# Patient Record
Sex: MALE | Race: OTHER | Employment: OTHER | URBAN - METROPOLITAN AREA
[De-identification: names, ages, dates, MRNs, and addresses within clinical notes are randomized per-mention and may not be internally consistent; named-entity substitution may affect disease eponyms.]

---

## 2018-02-15 PROBLEM — E78.00 HYPERCHOLESTEREMIA: Status: ACTIVE | Noted: 2018-02-15

## 2018-06-01 ENCOUNTER — OFFICE VISIT (OUTPATIENT)
Dept: FAMILY MEDICINE CLINIC | Age: 33
End: 2018-06-01
Payer: MEDICAID

## 2018-06-01 VITALS
RESPIRATION RATE: 16 BRPM | WEIGHT: 247 LBS | DIASTOLIC BLOOD PRESSURE: 66 MMHG | HEART RATE: 80 BPM | SYSTOLIC BLOOD PRESSURE: 120 MMHG | OXYGEN SATURATION: 98 % | TEMPERATURE: 98.4 F | BODY MASS INDEX: 35.36 KG/M2 | HEIGHT: 70 IN

## 2018-06-01 DIAGNOSIS — J30.1 CHRONIC SEASONAL ALLERGIC RHINITIS DUE TO POLLEN: ICD-10-CM

## 2018-06-01 DIAGNOSIS — F41.9 ANXIETY: Primary | ICD-10-CM

## 2018-06-01 PROCEDURE — G8417 CALC BMI ABV UP PARAM F/U: HCPCS | Performed by: NURSE PRACTITIONER

## 2018-06-01 PROCEDURE — 1036F TOBACCO NON-USER: CPT | Performed by: NURSE PRACTITIONER

## 2018-06-01 PROCEDURE — G8427 DOCREV CUR MEDS BY ELIG CLIN: HCPCS | Performed by: NURSE PRACTITIONER

## 2018-06-01 PROCEDURE — 99213 OFFICE O/P EST LOW 20 MIN: CPT | Performed by: NURSE PRACTITIONER

## 2018-06-01 RX ORDER — BUSPIRONE HYDROCHLORIDE 10 MG/1
10 TABLET ORAL 3 TIMES DAILY PRN
Qty: 90 TABLET | Refills: 1 | Status: SHIPPED | OUTPATIENT
Start: 2018-06-01 | End: 2018-07-26 | Stop reason: SDUPTHER

## 2018-06-01 RX ORDER — MONTELUKAST SODIUM 10 MG/1
10 TABLET ORAL DAILY
Qty: 30 TABLET | Refills: 3 | Status: SHIPPED | OUTPATIENT
Start: 2018-06-01 | End: 2018-09-17 | Stop reason: SDUPTHER

## 2018-06-01 ASSESSMENT — ENCOUNTER SYMPTOMS
DIARRHEA: 0
VOMITING: 0
CONSTIPATION: 0
SHORTNESS OF BREATH: 0
NAUSEA: 0
SINUS PAIN: 1
COUGH: 0
EYE REDNESS: 1
WHEEZING: 0
SORE THROAT: 0

## 2018-07-20 ENCOUNTER — OFFICE VISIT (OUTPATIENT)
Dept: FAMILY MEDICINE CLINIC | Age: 33
End: 2018-07-20
Payer: MEDICAID

## 2018-07-20 VITALS
DIASTOLIC BLOOD PRESSURE: 74 MMHG | SYSTOLIC BLOOD PRESSURE: 122 MMHG | RESPIRATION RATE: 14 BRPM | HEIGHT: 71 IN | TEMPERATURE: 98.1 F | WEIGHT: 240 LBS | BODY MASS INDEX: 33.6 KG/M2 | OXYGEN SATURATION: 98 % | HEART RATE: 70 BPM

## 2018-07-20 DIAGNOSIS — L30.9 DERMATITIS: Primary | ICD-10-CM

## 2018-07-20 DIAGNOSIS — K64.9 HEMORRHOIDS, UNSPECIFIED HEMORRHOID TYPE: ICD-10-CM

## 2018-07-20 DIAGNOSIS — L73.9 FOLLICULITIS: ICD-10-CM

## 2018-07-20 PROCEDURE — G8427 DOCREV CUR MEDS BY ELIG CLIN: HCPCS | Performed by: NURSE PRACTITIONER

## 2018-07-20 PROCEDURE — 96372 THER/PROPH/DIAG INJ SC/IM: CPT | Performed by: NURSE PRACTITIONER

## 2018-07-20 PROCEDURE — 99214 OFFICE O/P EST MOD 30 MIN: CPT | Performed by: NURSE PRACTITIONER

## 2018-07-20 PROCEDURE — G8417 CALC BMI ABV UP PARAM F/U: HCPCS | Performed by: NURSE PRACTITIONER

## 2018-07-20 PROCEDURE — 1036F TOBACCO NON-USER: CPT | Performed by: NURSE PRACTITIONER

## 2018-07-20 RX ORDER — BUPROPION HYDROCHLORIDE 150 MG/1
150 TABLET ORAL EVERY MORNING
COMMUNITY
End: 2019-12-23

## 2018-07-20 RX ORDER — METHYLPREDNISOLONE ACETATE 80 MG/ML
80 INJECTION, SUSPENSION INTRA-ARTICULAR; INTRALESIONAL; INTRAMUSCULAR; SOFT TISSUE ONCE
Status: COMPLETED | OUTPATIENT
Start: 2018-07-20 | End: 2018-07-20

## 2018-07-20 RX ORDER — BUSPIRONE HYDROCHLORIDE 10 MG/1
10 TABLET ORAL 2 TIMES DAILY
COMMUNITY
End: 2018-07-26 | Stop reason: SDUPTHER

## 2018-07-20 RX ORDER — SULFAMETHOXAZOLE AND TRIMETHOPRIM 800; 160 MG/1; MG/1
1 TABLET ORAL 2 TIMES DAILY
Qty: 20 TABLET | Refills: 0 | Status: SHIPPED | OUTPATIENT
Start: 2018-07-20 | End: 2018-07-30

## 2018-07-20 RX ADMIN — METHYLPREDNISOLONE ACETATE 80 MG: 80 INJECTION, SUSPENSION INTRA-ARTICULAR; INTRALESIONAL; INTRAMUSCULAR; SOFT TISSUE at 10:17

## 2018-07-20 ASSESSMENT — ENCOUNTER SYMPTOMS
CONSTIPATION: 0
COUGH: 0
VOMITING: 0
DIARRHEA: 0
NAUSEA: 0
WHEEZING: 0
BLOOD IN STOOL: 0
SHORTNESS OF BREATH: 0

## 2018-07-20 NOTE — PROGRESS NOTES
hemorrhoid type  -     hydrocortisone (ANUSOL-HC) 2.5 % rectal cream; Place rectally 2 times daily.   -decrease caffeine, increase fiber  -contact office if does not improve as expected    Greater than 25  Minutes was spent with patient and more than 50% of the time was spent face to face counseling and educating regarding diagnoses

## 2018-07-26 DIAGNOSIS — F41.9 ANXIETY: ICD-10-CM

## 2018-07-26 RX ORDER — BUSPIRONE HYDROCHLORIDE 10 MG/1
TABLET ORAL
Qty: 90 TABLET | Refills: 1 | Status: SHIPPED | OUTPATIENT
Start: 2018-07-26 | End: 2018-09-18 | Stop reason: SDUPTHER

## 2018-09-17 DIAGNOSIS — J30.1 CHRONIC SEASONAL ALLERGIC RHINITIS DUE TO POLLEN: ICD-10-CM

## 2018-09-18 DIAGNOSIS — F41.9 ANXIETY: ICD-10-CM

## 2018-09-18 RX ORDER — MONTELUKAST SODIUM 10 MG/1
TABLET ORAL
Qty: 30 TABLET | Refills: 3 | Status: SHIPPED | OUTPATIENT
Start: 2018-09-18 | End: 2019-01-15 | Stop reason: SDUPTHER

## 2018-09-18 RX ORDER — BUSPIRONE HYDROCHLORIDE 10 MG/1
TABLET ORAL
Qty: 90 TABLET | Refills: 1 | Status: SHIPPED | OUTPATIENT
Start: 2018-09-18 | End: 2018-11-16 | Stop reason: SDUPTHER

## 2018-11-16 DIAGNOSIS — F41.9 ANXIETY: ICD-10-CM

## 2018-11-16 RX ORDER — BUSPIRONE HYDROCHLORIDE 10 MG/1
TABLET ORAL
Qty: 90 TABLET | Refills: 1 | Status: SHIPPED | OUTPATIENT
Start: 2018-11-16 | End: 2019-07-14 | Stop reason: SDUPTHER

## 2019-01-15 DIAGNOSIS — J30.1 CHRONIC SEASONAL ALLERGIC RHINITIS DUE TO POLLEN: ICD-10-CM

## 2019-01-15 RX ORDER — MONTELUKAST SODIUM 10 MG/1
TABLET ORAL
Qty: 30 TABLET | Refills: 3 | Status: SHIPPED | OUTPATIENT
Start: 2019-01-15 | End: 2019-05-15 | Stop reason: SDUPTHER

## 2019-02-11 ENCOUNTER — HOSPITAL ENCOUNTER (OUTPATIENT)
Age: 34
Discharge: HOME | End: 2019-02-13

## 2019-02-11 PROCEDURE — 88302 TISSUE EXAM BY PATHOLOGIST: CPT

## 2019-03-05 ENCOUNTER — OFFICE VISIT (OUTPATIENT)
Dept: FAMILY MEDICINE CLINIC | Age: 34
End: 2019-03-05
Payer: MEDICAID

## 2019-03-05 VITALS
TEMPERATURE: 98.2 F | HEIGHT: 71 IN | OXYGEN SATURATION: 98 % | HEART RATE: 67 BPM | BODY MASS INDEX: 34.02 KG/M2 | SYSTOLIC BLOOD PRESSURE: 100 MMHG | RESPIRATION RATE: 16 BRPM | WEIGHT: 243 LBS | DIASTOLIC BLOOD PRESSURE: 70 MMHG

## 2019-03-05 DIAGNOSIS — R20.0 NUMBNESS AND TINGLING IN LEFT HAND: ICD-10-CM

## 2019-03-05 DIAGNOSIS — R20.2 NUMBNESS AND TINGLING IN LEFT HAND: ICD-10-CM

## 2019-03-05 DIAGNOSIS — E78.00 HYPERCHOLESTEREMIA: Primary | ICD-10-CM

## 2019-03-05 DIAGNOSIS — E55.9 VITAMIN D DEFICIENCY: ICD-10-CM

## 2019-03-05 PROCEDURE — 1036F TOBACCO NON-USER: CPT | Performed by: NURSE PRACTITIONER

## 2019-03-05 PROCEDURE — G8427 DOCREV CUR MEDS BY ELIG CLIN: HCPCS | Performed by: NURSE PRACTITIONER

## 2019-03-05 PROCEDURE — 99213 OFFICE O/P EST LOW 20 MIN: CPT | Performed by: NURSE PRACTITIONER

## 2019-03-05 PROCEDURE — G8417 CALC BMI ABV UP PARAM F/U: HCPCS | Performed by: NURSE PRACTITIONER

## 2019-03-05 PROCEDURE — G8484 FLU IMMUNIZE NO ADMIN: HCPCS | Performed by: NURSE PRACTITIONER

## 2019-03-05 RX ORDER — ERGOCALCIFEROL 1.25 MG/1
50000 CAPSULE ORAL
Qty: 24 CAPSULE | Refills: 0 | Status: SHIPPED | OUTPATIENT
Start: 2019-03-07 | End: 2019-05-31

## 2019-03-05 RX ORDER — ICOSAPENT ETHYL 1000 MG/1
2 CAPSULE ORAL 2 TIMES DAILY
Qty: 120 CAPSULE | Refills: 3 | Status: SHIPPED | OUTPATIENT
Start: 2019-03-05 | End: 2019-05-31

## 2019-03-05 ASSESSMENT — PATIENT HEALTH QUESTIONNAIRE - PHQ9
SUM OF ALL RESPONSES TO PHQ QUESTIONS 1-9: 1
2. FEELING DOWN, DEPRESSED OR HOPELESS: 1
SUM OF ALL RESPONSES TO PHQ QUESTIONS 1-9: 1
1. LITTLE INTEREST OR PLEASURE IN DOING THINGS: 0
SUM OF ALL RESPONSES TO PHQ9 QUESTIONS 1 & 2: 1

## 2019-03-05 ASSESSMENT — ENCOUNTER SYMPTOMS
VOMITING: 0
CONSTIPATION: 0
DIARRHEA: 0
SHORTNESS OF BREATH: 0
NAUSEA: 0
COUGH: 0
WHEEZING: 0

## 2019-03-16 ENCOUNTER — APPOINTMENT (OUTPATIENT)
Dept: CT IMAGING | Age: 34
DRG: 241 | End: 2019-03-16
Payer: MEDICAID

## 2019-03-16 ENCOUNTER — HOSPITAL ENCOUNTER (INPATIENT)
Age: 34
LOS: 2 days | Discharge: HOME | DRG: 241 | End: 2019-03-18
Attending: EMERGENCY MEDICINE | Admitting: SURGERY
Payer: MEDICAID

## 2019-03-16 DIAGNOSIS — R11.2 NAUSEA AND VOMITING, INTRACTABILITY OF VOMITING NOT SPECIFIED, UNSPECIFIED VOMITING TYPE: ICD-10-CM

## 2019-03-16 DIAGNOSIS — R10.84 GENERALIZED ABDOMINAL PAIN: Primary | ICD-10-CM

## 2019-03-16 LAB
ALBUMIN SERPL-MCNC: 4.7 G/DL (ref 3.5–5.2)
ALP BLD-CCNC: 79 U/L (ref 40–129)
ALT SERPL-CCNC: 31 U/L (ref 0–40)
ANION GAP SERPL CALCULATED.3IONS-SCNC: 14 MMOL/L (ref 7–16)
AST SERPL-CCNC: 27 U/L (ref 0–39)
BASOPHILS ABSOLUTE: 0 E9/L (ref 0–0.2)
BASOPHILS RELATIVE PERCENT: 0.3 % (ref 0–2)
BILIRUB SERPL-MCNC: 0.5 MG/DL (ref 0–1.2)
BILIRUBIN URINE: NEGATIVE
BLOOD, URINE: NEGATIVE
BUN BLDV-MCNC: 21 MG/DL (ref 6–20)
CALCIUM SERPL-MCNC: 10.1 MG/DL (ref 8.6–10.2)
CHLORIDE BLD-SCNC: 102 MMOL/L (ref 98–107)
CLARITY: CLEAR
CO2: 25 MMOL/L (ref 22–29)
COLOR: YELLOW
CREAT SERPL-MCNC: 1.2 MG/DL (ref 0.7–1.2)
EOSINOPHILS ABSOLUTE: 0.1 E9/L (ref 0.05–0.5)
EOSINOPHILS RELATIVE PERCENT: 0.9 % (ref 0–6)
GFR AFRICAN AMERICAN: >60
GFR NON-AFRICAN AMERICAN: >60 ML/MIN/1.73
GLUCOSE BLD-MCNC: 134 MG/DL (ref 74–99)
GLUCOSE URINE: NEGATIVE MG/DL
HCT VFR BLD CALC: 47.6 % (ref 37–54)
HEMOGLOBIN: 16.9 G/DL (ref 12.5–16.5)
KETONES, URINE: NEGATIVE MG/DL
LACTIC ACID: 1.8 MMOL/L (ref 0.5–2.2)
LEUKOCYTE ESTERASE, URINE: NEGATIVE
LIPASE: 19 U/L (ref 13–60)
LYMPHOCYTES ABSOLUTE: 0.57 E9/L (ref 1.5–4)
LYMPHOCYTES RELATIVE PERCENT: 5.3 % (ref 20–42)
MCH RBC QN AUTO: 29.8 PG (ref 26–35)
MCHC RBC AUTO-ENTMCNC: 35.5 % (ref 32–34.5)
MCV RBC AUTO: 84 FL (ref 80–99.9)
MONOCYTES ABSOLUTE: 0.34 E9/L (ref 0.1–0.95)
MONOCYTES RELATIVE PERCENT: 2.6 % (ref 2–12)
NEUTROPHILS ABSOLUTE: 10.28 E9/L (ref 1.8–7.3)
NEUTROPHILS RELATIVE PERCENT: 91.2 % (ref 43–80)
NITRITE, URINE: NEGATIVE
PDW BLD-RTO: 12.2 FL (ref 11.5–15)
PH UA: 5.5 (ref 5–9)
PLATELET # BLD: 223 E9/L (ref 130–450)
PMV BLD AUTO: 10.6 FL (ref 7–12)
POTASSIUM SERPL-SCNC: 4.5 MMOL/L (ref 3.5–5)
PROTEIN UA: NEGATIVE MG/DL
RBC # BLD: 5.67 E12/L (ref 3.8–5.8)
SODIUM BLD-SCNC: 141 MMOL/L (ref 132–146)
SPECIFIC GRAVITY UA: 1.02 (ref 1–1.03)
TOTAL PROTEIN: 7.7 G/DL (ref 6.4–8.3)
UROBILINOGEN, URINE: 0.2 E.U./DL
WBC # BLD: 11.3 E9/L (ref 4.5–11.5)

## 2019-03-16 PROCEDURE — 96375 TX/PRO/DX INJ NEW DRUG ADDON: CPT

## 2019-03-16 PROCEDURE — 96374 THER/PROPH/DIAG INJ IV PUSH: CPT

## 2019-03-16 PROCEDURE — 6360000004 HC RX CONTRAST MEDICATION: Performed by: RADIOLOGY

## 2019-03-16 PROCEDURE — 80053 COMPREHEN METABOLIC PANEL: CPT

## 2019-03-16 PROCEDURE — 83690 ASSAY OF LIPASE: CPT

## 2019-03-16 PROCEDURE — 6360000002 HC RX W HCPCS: Performed by: SURGERY

## 2019-03-16 PROCEDURE — 2580000003 HC RX 258: Performed by: EMERGENCY MEDICINE

## 2019-03-16 PROCEDURE — 96376 TX/PRO/DX INJ SAME DRUG ADON: CPT

## 2019-03-16 PROCEDURE — 99285 EMERGENCY DEPT VISIT HI MDM: CPT

## 2019-03-16 PROCEDURE — 83605 ASSAY OF LACTIC ACID: CPT

## 2019-03-16 PROCEDURE — 1200000000 HC SEMI PRIVATE

## 2019-03-16 PROCEDURE — 2580000003 HC RX 258: Performed by: SURGERY

## 2019-03-16 PROCEDURE — 6360000002 HC RX W HCPCS: Performed by: EMERGENCY MEDICINE

## 2019-03-16 PROCEDURE — 81003 URINALYSIS AUTO W/O SCOPE: CPT

## 2019-03-16 PROCEDURE — 6360000002 HC RX W HCPCS

## 2019-03-16 PROCEDURE — 85025 COMPLETE CBC W/AUTO DIFF WBC: CPT

## 2019-03-16 PROCEDURE — G0378 HOSPITAL OBSERVATION PER HR: HCPCS

## 2019-03-16 PROCEDURE — 74177 CT ABD & PELVIS W/CONTRAST: CPT

## 2019-03-16 PROCEDURE — 6370000000 HC RX 637 (ALT 250 FOR IP): Performed by: SURGERY

## 2019-03-16 PROCEDURE — 36415 COLL VENOUS BLD VENIPUNCTURE: CPT

## 2019-03-16 RX ORDER — SODIUM CHLORIDE 0.9 % (FLUSH) 0.9 %
10 SYRINGE (ML) INJECTION EVERY 12 HOURS SCHEDULED
Status: DISCONTINUED | OUTPATIENT
Start: 2019-03-16 | End: 2019-03-18 | Stop reason: HOSPADM

## 2019-03-16 RX ORDER — SODIUM CHLORIDE 9 MG/ML
INJECTION, SOLUTION INTRAVENOUS CONTINUOUS
Status: DISCONTINUED | OUTPATIENT
Start: 2019-03-16 | End: 2019-03-18

## 2019-03-16 RX ORDER — SODIUM CHLORIDE 0.9 % (FLUSH) 0.9 %
10 SYRINGE (ML) INJECTION PRN
Status: DISCONTINUED | OUTPATIENT
Start: 2019-03-16 | End: 2019-03-18 | Stop reason: HOSPADM

## 2019-03-16 RX ORDER — HYDROCODONE BITARTRATE AND ACETAMINOPHEN 5; 325 MG/1; MG/1
1 TABLET ORAL EVERY 4 HOURS PRN
Status: DISCONTINUED | OUTPATIENT
Start: 2019-03-16 | End: 2019-03-18 | Stop reason: HOSPADM

## 2019-03-16 RX ORDER — FENTANYL CITRATE 50 UG/ML
50 INJECTION, SOLUTION INTRAMUSCULAR; INTRAVENOUS ONCE
Status: COMPLETED | OUTPATIENT
Start: 2019-03-16 | End: 2019-03-16

## 2019-03-16 RX ORDER — ONDANSETRON 2 MG/ML
4 INJECTION INTRAMUSCULAR; INTRAVENOUS EVERY 6 HOURS PRN
Status: DISCONTINUED | OUTPATIENT
Start: 2019-03-16 | End: 2019-03-18 | Stop reason: HOSPADM

## 2019-03-16 RX ORDER — MORPHINE SULFATE 4 MG/ML
INJECTION, SOLUTION INTRAMUSCULAR; INTRAVENOUS
Status: COMPLETED
Start: 2019-03-16 | End: 2019-03-16

## 2019-03-16 RX ORDER — ONDANSETRON 2 MG/ML
4 INJECTION INTRAMUSCULAR; INTRAVENOUS ONCE
Status: COMPLETED | OUTPATIENT
Start: 2019-03-16 | End: 2019-03-16

## 2019-03-16 RX ORDER — HYDROCODONE BITARTRATE AND ACETAMINOPHEN 5; 325 MG/1; MG/1
2 TABLET ORAL EVERY 4 HOURS PRN
Status: DISCONTINUED | OUTPATIENT
Start: 2019-03-16 | End: 2019-03-18 | Stop reason: HOSPADM

## 2019-03-16 RX ORDER — MORPHINE SULFATE 4 MG/ML
4 INJECTION, SOLUTION INTRAMUSCULAR; INTRAVENOUS
Status: DISCONTINUED | OUTPATIENT
Start: 2019-03-16 | End: 2019-03-18 | Stop reason: HOSPADM

## 2019-03-16 RX ORDER — SODIUM CHLORIDE, SODIUM LACTATE, POTASSIUM CHLORIDE, CALCIUM CHLORIDE 600; 310; 30; 20 MG/100ML; MG/100ML; MG/100ML; MG/100ML
INJECTION, SOLUTION INTRAVENOUS CONTINUOUS
Status: DISCONTINUED | OUTPATIENT
Start: 2019-03-16 | End: 2019-03-18

## 2019-03-16 RX ORDER — MORPHINE SULFATE 4 MG/ML
2 INJECTION, SOLUTION INTRAMUSCULAR; INTRAVENOUS
Status: DISCONTINUED | OUTPATIENT
Start: 2019-03-16 | End: 2019-03-18 | Stop reason: HOSPADM

## 2019-03-16 RX ORDER — KETOROLAC TROMETHAMINE 30 MG/ML
30 INJECTION, SOLUTION INTRAMUSCULAR; INTRAVENOUS EVERY 6 HOURS
Status: COMPLETED | OUTPATIENT
Start: 2019-03-16 | End: 2019-03-18

## 2019-03-16 RX ORDER — 0.9 % SODIUM CHLORIDE 0.9 %
1000 INTRAVENOUS SOLUTION INTRAVENOUS ONCE
Status: COMPLETED | OUTPATIENT
Start: 2019-03-16 | End: 2019-03-16

## 2019-03-16 RX ORDER — ACETAMINOPHEN 325 MG/1
650 TABLET ORAL EVERY 4 HOURS PRN
Status: DISCONTINUED | OUTPATIENT
Start: 2019-03-16 | End: 2019-03-18 | Stop reason: HOSPADM

## 2019-03-16 RX ADMIN — ONDANSETRON 4 MG: 2 INJECTION INTRAMUSCULAR; INTRAVENOUS at 23:42

## 2019-03-16 RX ADMIN — ACETAMINOPHEN 650 MG: 325 TABLET, FILM COATED ORAL at 18:52

## 2019-03-16 RX ADMIN — MORPHINE SULFATE 4 MG: 4 INJECTION, SOLUTION INTRAMUSCULAR; INTRAVENOUS at 14:20

## 2019-03-16 RX ADMIN — FENTANYL CITRATE 50 MCG: 50 INJECTION, SOLUTION INTRAMUSCULAR; INTRAVENOUS at 11:02

## 2019-03-16 RX ADMIN — ONDANSETRON 4 MG: 2 INJECTION INTRAMUSCULAR; INTRAVENOUS at 11:02

## 2019-03-16 RX ADMIN — MORPHINE SULFATE 4 MG: 4 INJECTION INTRAVENOUS at 14:20

## 2019-03-16 RX ADMIN — SODIUM CHLORIDE, POTASSIUM CHLORIDE, SODIUM LACTATE AND CALCIUM CHLORIDE: 600; 310; 30; 20 INJECTION, SOLUTION INTRAVENOUS at 15:47

## 2019-03-16 RX ADMIN — IOPAMIDOL 80 ML: 755 INJECTION, SOLUTION INTRAVENOUS at 12:39

## 2019-03-16 RX ADMIN — SODIUM CHLORIDE 1000 ML: 9 INJECTION, SOLUTION INTRAVENOUS at 11:02

## 2019-03-16 RX ADMIN — KETOROLAC TROMETHAMINE 30 MG: 30 INJECTION, SOLUTION INTRAMUSCULAR; INTRAVENOUS at 16:47

## 2019-03-16 RX ADMIN — SODIUM CHLORIDE, POTASSIUM CHLORIDE, SODIUM LACTATE AND CALCIUM CHLORIDE: 600; 310; 30; 20 INJECTION, SOLUTION INTRAVENOUS at 21:29

## 2019-03-16 RX ADMIN — SODIUM CHLORIDE: 9 INJECTION, SOLUTION INTRAVENOUS at 14:13

## 2019-03-16 RX ADMIN — ONDANSETRON 4 MG: 2 INJECTION INTRAMUSCULAR; INTRAVENOUS at 16:49

## 2019-03-16 RX ADMIN — KETOROLAC TROMETHAMINE 30 MG: 30 INJECTION, SOLUTION INTRAMUSCULAR; INTRAVENOUS at 21:28

## 2019-03-16 ASSESSMENT — ENCOUNTER SYMPTOMS
CONSTIPATION: 0
VOMITING: 1
EYE DISCHARGE: 0
BLOOD IN STOOL: 0
EYE PAIN: 0
COUGH: 0
ABDOMINAL DISTENTION: 0
SHORTNESS OF BREATH: 0
DIARRHEA: 1
NAUSEA: 1
BACK PAIN: 0
EYE REDNESS: 0
ABDOMINAL PAIN: 1

## 2019-03-16 ASSESSMENT — PAIN DESCRIPTION - DESCRIPTORS
DESCRIPTORS: CRAMPING;STABBING;SHARP
DESCRIPTORS: DISCOMFORT;ACHING;CRAMPING

## 2019-03-16 ASSESSMENT — PAIN SCALES - GENERAL
PAINLEVEL_OUTOF10: 7
PAINLEVEL_OUTOF10: 6
PAINLEVEL_OUTOF10: 5
PAINLEVEL_OUTOF10: 6
PAINLEVEL_OUTOF10: 6
PAINLEVEL_OUTOF10: 7
PAINLEVEL_OUTOF10: 7

## 2019-03-16 ASSESSMENT — PAIN DESCRIPTION - FREQUENCY: FREQUENCY: INTERMITTENT

## 2019-03-16 ASSESSMENT — PAIN DESCRIPTION - LOCATION: LOCATION: ABDOMEN

## 2019-03-16 ASSESSMENT — PAIN DESCRIPTION - PAIN TYPE: TYPE: ACUTE PAIN

## 2019-03-16 ASSESSMENT — PAIN - FUNCTIONAL ASSESSMENT: PAIN_FUNCTIONAL_ASSESSMENT: ACTIVITIES ARE NOT PREVENTED

## 2019-03-16 ASSESSMENT — PAIN DESCRIPTION - ORIENTATION: ORIENTATION: MID;LOWER

## 2019-03-16 NOTE — ED PROVIDER NOTES
breath sounds normal. No respiratory distress. He has no wheezes. He has no rales. Abdominal: Soft. There is tenderness. There is guarding. There is no rebound. Diminished bowel sounds   Musculoskeletal: He exhibits no edema. Neurological: He is alert and oriented to person, place, and time. No cranial nerve deficit. Coordination normal.   Skin: Skin is warm and dry. Capillary refill takes less than 2 seconds. He is not diaphoretic. Psychiatric:   anxious   Nursing note and vitals reviewed. Procedures    MDM       .       --------------------------------------------- PAST HISTORY ---------------------------------------------  Past Medical History:  has a past medical history of Allergic rhinitis, Anxiety, Chronic back pain, Depression, and Neuropathy. Past Surgical History:  has a past surgical history that includes lumbar discectomy; Septoplasty; Cosmetic surgery; lumbar discectomy; and vascular surgery. Social History:  reports that he has never smoked. He has never used smokeless tobacco. He reports that he drinks alcohol. He reports that he does not use drugs. Family History: family history is not on file. The patients home medications have been reviewed.     Allergies: Gabapentin; Percocet [oxycodone-acetaminophen]; and Vicodin [hydrocodone-acetaminophen]    -------------------------------------------------- RESULTS -------------------------------------------------    Lab  Results for orders placed or performed during the hospital encounter of 03/16/19   CBC Auto Differential   Result Value Ref Range    WBC 11.3 4.5 - 11.5 E9/L    RBC 5.67 3.80 - 5.80 E12/L    Hemoglobin 16.9 (H) 12.5 - 16.5 g/dL    Hematocrit 47.6 37.0 - 54.0 %    MCV 84.0 80.0 - 99.9 fL    MCH 29.8 26.0 - 35.0 pg    MCHC 35.5 (H) 32.0 - 34.5 %    RDW 12.2 11.5 - 15.0 fL    Platelets 879 108 - 856 E9/L    MPV 10.6 7.0 - 12.0 fL    Neutrophils % 91.2 (H) 43.0 - 80.0 %    Lymphocytes % 5.3 (L) 20.0 - 42.0 %    Monocytes % 2.6 2.0 - 12.0 %    Eosinophils % 0.9 0.0 - 6.0 %    Basophils % 0.3 0.0 - 2.0 %    Neutrophils # 10.28 (H) 1.80 - 7.30 E9/L    Lymphocytes # 0.57 (L) 1.50 - 4.00 E9/L    Monocytes # 0.34 0.10 - 0.95 E9/L    Eosinophils # 0.10 0.05 - 0.50 E9/L    Basophils # 0.00 0.00 - 0.20 E9/L   Comprehensive Metabolic Panel   Result Value Ref Range    Sodium 141 132 - 146 mmol/L    Potassium 4.5 3.5 - 5.0 mmol/L    Chloride 102 98 - 107 mmol/L    CO2 25 22 - 29 mmol/L    Anion Gap 14 7 - 16 mmol/L    Glucose 134 (H) 74 - 99 mg/dL    BUN 21 (H) 6 - 20 mg/dL    CREATININE 1.2 0.7 - 1.2 mg/dL    GFR Non-African American >60 >=60 mL/min/1.73    GFR African American >60     Calcium 10.1 8.6 - 10.2 mg/dL    Total Protein 7.7 6.4 - 8.3 g/dL    Alb 4.7 3.5 - 5.2 g/dL    Total Bilirubin 0.5 0.0 - 1.2 mg/dL    Alkaline Phosphatase 79 40 - 129 U/L    ALT 31 0 - 40 U/L    AST 27 0 - 39 U/L   Lipase   Result Value Ref Range    Lipase 19 13 - 60 U/L   Lactic Acid, Plasma   Result Value Ref Range    Lactic Acid 1.8 0.5 - 2.2 mmol/L   Urinalysis   Result Value Ref Range    Color, UA Yellow Straw/Yellow    Clarity, UA Clear Clear    Glucose, Ur Negative Negative mg/dL    Bilirubin Urine Negative Negative    Ketones, Urine Negative Negative mg/dL    Specific Gravity, UA 1.025 1.005 - 1.030    Blood, Urine Negative Negative    pH, UA 5.5 5.0 - 9.0    Protein, UA Negative Negative mg/dL    Urobilinogen, Urine 0.2 <2.0 E.U./dL    Nitrite, Urine Negative Negative    Leukocyte Esterase, Urine Negative Negative       Radiology  CT ABDOMEN PELVIS W IV CONTRAST Additional Contrast? Oral   Final Result   1. Most of oral contrast is located within the stomach, which is   moderately distended. Visualization of contrast within the small bowel   more distally excludes complete small bowel obstruction and findings   raise the question of gastroparesis versus partial gastric outlet   obstruction. Clinical correlation is recommended.    2. Contrast within distended distal thoracic esophagus, which may be   secondary to moderately distended state of the stomach. 3. Moderately distended small bowel loops within left side of abdomen   and upper pelvis, with findings suggesting nonspecific segmental   circumferential thickening of these dilated small bowel loops. Question infectious or inflammatory process. 4. Appearance of appendix is visualized on this examination and is   unremarkable. 5. Fluid within portions of colon and rectum, consistent with patient   history of diarrhea            ------------------------- NURSING NOTES AND VITALS REVIEWED ---------------------------  Date / Time Roomed:  3/16/2019 10:10 AM  ED Bed Assignment:  13/13    The nursing notes within the ED encounter and vital signs as below have been reviewed. Patient Vitals for the past 24 hrs:   BP Temp Temp src Pulse Resp SpO2 Height Weight   03/16/19 1215 125/78 98.5 °F (36.9 °C) Oral 106 16 97 % -- --   03/16/19 1019 125/80 97.9 °F (36.6 °C) Oral 102 18 99 % 5' 11\" (1.803 m) 237 lb (107.5 kg)       Oxygen Saturation Interpretation: Normal      ------------------------------------------ PROGRESS NOTES ------------------------------------------  Re-evaluation(s):  Time: 13:40. Patients symptoms show no change  Repeat physical examination is not changed. She continues to guard with palpation of the abdomen. I have spoken with the patient and discussed todays results, in addition to providing specific details for the plan of care and counseling regarding the diagnosis and prognosis. Their questions are answered at this time and they are agreeable with the plan.      --------------------------------- ADDITIONAL PROVIDER NOTES ---------------------------------  Consultations:  Spoke with Dr. Mary Estrada,  They will admit this patient.     This patient's ED course included: a personal history and physicial examination and multiple bedside re-evaluations    This patient has remained

## 2019-03-17 ENCOUNTER — APPOINTMENT (OUTPATIENT)
Dept: GENERAL RADIOLOGY | Age: 34
DRG: 241 | End: 2019-03-17
Payer: MEDICAID

## 2019-03-17 LAB
ALBUMIN SERPL-MCNC: 3.7 G/DL (ref 3.5–5.2)
ALP BLD-CCNC: 56 U/L (ref 40–129)
ALT SERPL-CCNC: 24 U/L (ref 0–40)
ANION GAP SERPL CALCULATED.3IONS-SCNC: 11 MMOL/L (ref 7–16)
AST SERPL-CCNC: 15 U/L (ref 0–39)
BILIRUB SERPL-MCNC: 0.5 MG/DL (ref 0–1.2)
BUN BLDV-MCNC: 15 MG/DL (ref 6–20)
C-REACTIVE PROTEIN: 9.1 MG/DL (ref 0–0.4)
CALCIUM SERPL-MCNC: 8.8 MG/DL (ref 8.6–10.2)
CHLORIDE BLD-SCNC: 101 MMOL/L (ref 98–107)
CO2: 24 MMOL/L (ref 22–29)
CREAT SERPL-MCNC: 1 MG/DL (ref 0.7–1.2)
GFR AFRICAN AMERICAN: >60
GFR NON-AFRICAN AMERICAN: >60 ML/MIN/1.73
GLUCOSE BLD-MCNC: 125 MG/DL (ref 74–99)
HCT VFR BLD CALC: 41.8 % (ref 37–54)
HEMOGLOBIN: 14.6 G/DL (ref 12.5–16.5)
LIPASE: 15 U/L (ref 13–60)
MCH RBC QN AUTO: 29.1 PG (ref 26–35)
MCHC RBC AUTO-ENTMCNC: 34.9 % (ref 32–34.5)
MCV RBC AUTO: 83.4 FL (ref 80–99.9)
PDW BLD-RTO: 12.3 FL (ref 11.5–15)
PLATELET # BLD: 187 E9/L (ref 130–450)
PMV BLD AUTO: 10.1 FL (ref 7–12)
POTASSIUM REFLEX MAGNESIUM: 3.8 MMOL/L (ref 3.5–5)
RBC # BLD: 5.01 E12/L (ref 3.8–5.8)
SEDIMENTATION RATE, ERYTHROCYTE: 1 MM/HR (ref 0–15)
SODIUM BLD-SCNC: 136 MMOL/L (ref 132–146)
TOTAL PROTEIN: 6.2 G/DL (ref 6.4–8.3)
WBC # BLD: 5.3 E9/L (ref 4.5–11.5)

## 2019-03-17 PROCEDURE — 85027 COMPLETE CBC AUTOMATED: CPT

## 2019-03-17 PROCEDURE — 96376 TX/PRO/DX INJ SAME DRUG ADON: CPT

## 2019-03-17 PROCEDURE — 87328 CRYPTOSPORIDIUM AG IA: CPT

## 2019-03-17 PROCEDURE — 1200000000 HC SEMI PRIVATE

## 2019-03-17 PROCEDURE — 87425 ROTAVIRUS AG IA: CPT

## 2019-03-17 PROCEDURE — 85651 RBC SED RATE NONAUTOMATED: CPT

## 2019-03-17 PROCEDURE — C9113 INJ PANTOPRAZOLE SODIUM, VIA: HCPCS | Performed by: STUDENT IN AN ORGANIZED HEALTH CARE EDUCATION/TRAINING PROGRAM

## 2019-03-17 PROCEDURE — 6370000000 HC RX 637 (ALT 250 FOR IP): Performed by: STUDENT IN AN ORGANIZED HEALTH CARE EDUCATION/TRAINING PROGRAM

## 2019-03-17 PROCEDURE — 2580000003 HC RX 258: Performed by: SURGERY

## 2019-03-17 PROCEDURE — 83690 ASSAY OF LIPASE: CPT

## 2019-03-17 PROCEDURE — 6360000002 HC RX W HCPCS: Performed by: STUDENT IN AN ORGANIZED HEALTH CARE EDUCATION/TRAINING PROGRAM

## 2019-03-17 PROCEDURE — 87329 GIARDIA AG IA: CPT

## 2019-03-17 PROCEDURE — 86140 C-REACTIVE PROTEIN: CPT

## 2019-03-17 PROCEDURE — G0378 HOSPITAL OBSERVATION PER HR: HCPCS

## 2019-03-17 PROCEDURE — 6360000002 HC RX W HCPCS: Performed by: SURGERY

## 2019-03-17 PROCEDURE — 96375 TX/PRO/DX INJ NEW DRUG ADDON: CPT

## 2019-03-17 PROCEDURE — 89055 LEUKOCYTE ASSESSMENT FECAL: CPT

## 2019-03-17 PROCEDURE — 87045 FECES CULTURE AEROBIC BACT: CPT

## 2019-03-17 PROCEDURE — 74018 RADEX ABDOMEN 1 VIEW: CPT

## 2019-03-17 PROCEDURE — 99220 PR INITIAL OBSERVATION CARE/DAY 70 MINUTES: CPT | Performed by: SURGERY

## 2019-03-17 PROCEDURE — 36415 COLL VENOUS BLD VENIPUNCTURE: CPT

## 2019-03-17 PROCEDURE — 80053 COMPREHEN METABOLIC PANEL: CPT

## 2019-03-17 PROCEDURE — 87077 CULTURE AEROBIC IDENTIFY: CPT

## 2019-03-17 RX ORDER — PANTOPRAZOLE SODIUM 40 MG/10ML
40 INJECTION, POWDER, LYOPHILIZED, FOR SOLUTION INTRAVENOUS 2 TIMES DAILY
Status: DISCONTINUED | OUTPATIENT
Start: 2019-03-17 | End: 2019-03-18

## 2019-03-17 RX ADMIN — KETOROLAC TROMETHAMINE 30 MG: 30 INJECTION, SOLUTION INTRAMUSCULAR; INTRAVENOUS at 16:44

## 2019-03-17 RX ADMIN — Medication 10 ML: at 21:59

## 2019-03-17 RX ADMIN — ONDANSETRON 4 MG: 2 INJECTION INTRAMUSCULAR; INTRAVENOUS at 20:22

## 2019-03-17 RX ADMIN — MORPHINE SULFATE 2 MG: 4 INJECTION, SOLUTION INTRAMUSCULAR; INTRAVENOUS at 06:23

## 2019-03-17 RX ADMIN — PANTOPRAZOLE SODIUM 40 MG: 40 INJECTION, POWDER, FOR SOLUTION INTRAVENOUS at 21:59

## 2019-03-17 RX ADMIN — KETOROLAC TROMETHAMINE 30 MG: 30 INJECTION, SOLUTION INTRAMUSCULAR; INTRAVENOUS at 04:03

## 2019-03-17 RX ADMIN — SODIUM CHLORIDE, POTASSIUM CHLORIDE, SODIUM LACTATE AND CALCIUM CHLORIDE: 600; 310; 30; 20 INJECTION, SOLUTION INTRAVENOUS at 12:38

## 2019-03-17 RX ADMIN — KETOROLAC TROMETHAMINE 30 MG: 30 INJECTION, SOLUTION INTRAMUSCULAR; INTRAVENOUS at 21:59

## 2019-03-17 RX ADMIN — KETOROLAC TROMETHAMINE 30 MG: 30 INJECTION, SOLUTION INTRAMUSCULAR; INTRAVENOUS at 09:09

## 2019-03-17 RX ADMIN — PHENOL: 1.5 LIQUID ORAL at 20:22

## 2019-03-17 RX ADMIN — PANTOPRAZOLE SODIUM 40 MG: 40 INJECTION, POWDER, FOR SOLUTION INTRAVENOUS at 12:38

## 2019-03-17 RX ADMIN — Medication 10 ML: at 12:38

## 2019-03-17 RX ADMIN — SODIUM CHLORIDE, POTASSIUM CHLORIDE, SODIUM LACTATE AND CALCIUM CHLORIDE: 600; 310; 30; 20 INJECTION, SOLUTION INTRAVENOUS at 21:59

## 2019-03-17 ASSESSMENT — PAIN DESCRIPTION - PAIN TYPE
TYPE: ACUTE PAIN
TYPE: ACUTE PAIN

## 2019-03-17 ASSESSMENT — PAIN SCALES - GENERAL
PAINLEVEL_OUTOF10: 6
PAINLEVEL_OUTOF10: 2
PAINLEVEL_OUTOF10: 5
PAINLEVEL_OUTOF10: 5
PAINLEVEL_OUTOF10: 4
PAINLEVEL_OUTOF10: 7
PAINLEVEL_OUTOF10: 5

## 2019-03-17 ASSESSMENT — PAIN DESCRIPTION - PROGRESSION: CLINICAL_PROGRESSION: GRADUALLY IMPROVING

## 2019-03-17 ASSESSMENT — PAIN - FUNCTIONAL ASSESSMENT: PAIN_FUNCTIONAL_ASSESSMENT: ACTIVITIES ARE NOT PREVENTED

## 2019-03-17 ASSESSMENT — PAIN DESCRIPTION - LOCATION
LOCATION: ABDOMEN
LOCATION: ABDOMEN

## 2019-03-17 ASSESSMENT — PAIN DESCRIPTION - DESCRIPTORS
DESCRIPTORS: SORE
DESCRIPTORS: DISCOMFORT;TENDER;SORE

## 2019-03-17 NOTE — PLAN OF CARE
Problem: Falls - Risk of:  Goal: Will remain free from falls  Description  Will remain free from falls  3/17/2019 0332 by aKren Gayle RN  Outcome: Met This Shift  3/16/2019 2254 by Sanjiv Garcia RN  Outcome: Met This Shift  Goal: Absence of physical injury  Description  Absence of physical injury  Outcome: Met This Shift     Problem: Activity:  Goal: Risk for activity intolerance will decrease  Description  Risk for activity intolerance will decrease  Outcome: Met This Shift     Problem:  Bowel/Gastric:  Goal: Bowel function will improve  Description  Bowel function will improve  3/17/2019 0332 by Karen Gayle RN  Outcome: Met This Shift  3/16/2019 2254 by Sanjiv Garcia RN  Outcome: Met This Shift     Problem: Fluid Volume:  Goal: Maintenance of adequate hydration will improve  Description  Maintenance of adequate hydration will improve  3/16/2019 2254 by Sanjiv Garcia RN  Outcome: Met This Shift     Problem: Sensory:  Goal: Pain level will decrease  Description  Pain level will decrease  3/16/2019 2254 by Sanjiv Garcia RN  Outcome: Met This Shift

## 2019-03-17 NOTE — H&P
General Surgery History and Physical    Patient's Name/Date of Birth: Sridevi Ledezma / 1985    Date: March 17, 2019     Surgeon: Kenna Ledezma M.D.    PCP: Noam Mchugh DO     Chief Complaint: diffuse abdominal pain    HPI:   Sridevi Ledezma is a 29 y.o. male who presents for evaluation of diffuse abdominal pain and had negative workup in ED but is still having severe pain.  Timing is constant, radiation to upper and lower abdomen, alleviated by nothing and started weeks ago but got a lot worse the last several days and severity is 5-9/10      Past Medical History:   Diagnosis Date    Allergic rhinitis     Anxiety     Chronic back pain     Depression     Hyperlipidemia     Neuropathy        Past Surgical History:   Procedure Laterality Date    COSMETIC SURGERY      LUMBAR DISCECTOMY      LUMBAR DISCECTOMY      SEPTOPLASTY      VASCULAR SURGERY      varicocelectomy       Current Facility-Administered Medications   Medication Dose Route Frequency Provider Last Rate Last Dose    iohexol (OMNIPAQUE 240) injection 50 mL  50 mL Oral ONCE PRN Jorge L Bond MD        sodium chloride flush 0.9 % injection 10 mL  10 mL Intravenous 2 times per day Jorge L Bond MD        sodium chloride flush 0.9 % injection 10 mL  10 mL Intravenous PRN Jorge L Bond MD        acetaminophen (TYLENOL) tablet 650 mg  650 mg Oral Q4H PRN Jorge L Bond MD   650 mg at 03/16/19 1852    ondansetron (ZOFRAN) injection 4 mg  4 mg Intravenous Q6H PRN Jorge L Bond MD   4 mg at 03/16/19 2342    [START ON 3/18/2019] enoxaparin (LOVENOX) injection 40 mg  40 mg Subcutaneous Daily Jorge L Bond MD        lactated ringers infusion   Intravenous Continuous Jorge L Bond  mL/hr at 03/16/19 2129      morphine injection 2 mg  2 mg Intravenous Q2H PRN Jorge L Bond MD   2 mg at 03/17/19 0944    Or    morphine injection 4 mg  4 mg Intravenous Q2H PRN Jorge L Bond MD   4 mg at 03/16/19 1420    HYDROcodone-acetaminophen (NORCO) 5-325 MG per tablet 1 tablet  1 tablet Oral Q4H PRN Del Disla MD        Or    HYDROcodone-acetaminophen [facility]) 5-325 MG per tablet 2 tablet  2 tablet Oral Q4H PRN Del Disla MD        ketorolac (TORADOL) injection 30 mg  30 mg Intravenous Q6H Del Disla MD   30 mg at 03/17/19 0403    0.9 % sodium chloride infusion   Intravenous Continuous Felipa Ross  mL/hr at 03/16/19 1413         Allergies   Allergen Reactions    Gabapentin Hives    Percocet [Oxycodone-Acetaminophen] Nausea And Vomiting    Vicodin [Hydrocodone-Acetaminophen] Nausea And Vomiting       The patient has a family history that is negative for severe cardiovascular or respiratory issues, negative for reaction to anesthesia.     Social History     Socioeconomic History    Marital status:      Spouse name: Not on file    Number of children: Not on file    Years of education: Not on file    Highest education level: Not on file   Occupational History    Not on file   Social Needs    Financial resource strain: Not on file    Food insecurity:     Worry: Not on file     Inability: Not on file    Transportation needs:     Medical: Not on file     Non-medical: Not on file   Tobacco Use    Smoking status: Never Smoker    Smokeless tobacco: Never Used   Substance and Sexual Activity    Alcohol use: Yes     Comment: rarely    Drug use: Never    Sexual activity: Not on file   Lifestyle    Physical activity:     Days per week: Not on file     Minutes per session: Not on file    Stress: Not on file   Relationships    Social connections:     Talks on phone: Not on file     Gets together: Not on file     Attends Yazidism service: Not on file     Active member of club or organization: Not on file     Attends meetings of clubs or organizations: Not on file     Relationship status: Not on file    Intimate partner violence:     Fear of current or ex partner: Not on file Emotionally abused: Not on file     Physically abused: Not on file     Forced sexual activity: Not on file   Other Topics Concern    Not on file   Social History Narrative    Not on file           Review of Systems  Review of Systems -  General ROS: negative for - chills, fatigue or malaise  ENT ROS: negative for - hearing change, nasal congestion or nasal discharge  Allergy and Immunology ROS: negative for - hives, itchy/watery eyes or nasal congestion  Hematological and Lymphatic ROS: negative for - blood clots, blood transfusions, bruising or fatigue  Endocrine ROS: negative for - malaise/lethargy, mood swings, palpitations or polydipsia/polyuria  Respiratory ROS: negative for - sputum changes, stridor, tachypnea or wheezing  Cardiovascular ROS: negative for - irregular heartbeat, loss of consciousness, murmur or orthopnea  Gastrointestinal ROS: negative for - constipation, diarrhea, gas/bloating, heartburn or hematemesis  Genito-Urinary ROS: negative for -  genital discharge, genital ulcers or hematuria  Musculoskeletal ROS: negative for - gait disturbance, muscle pain or muscular weakness    Physical exam:   /69   Pulse 115   Temp 99.7 °F (37.6 °C) (Oral)   Resp 18   Ht 5' 11\" (1.803 m)   Wt 237 lb (107.5 kg)   SpO2 99%   BMI 33.05 kg/m²   General appearance:  NAD  Head: NCAT, PERRLA, EOMI, red conjunctiva  Neck: supple, no masses  Lungs: CTAB, equal chest rise bilateral  Heart: Reg rate  Abdomen: soft, moderately tender diffusely without peritonitis signs, nondistended  Skin; no lesions  Gu: no cva tenderness  Extremities: extremities normal, atraumatic, no cyanosis or edema      Radiology:  CT abdomen/pelvis:   neg    Assessment:  29 y.o. male with diffuse abdominal pain     Plan:   Will ask GI for evaluation and scopes as in or outpt based on there findings, will get HIDA with Zuleyka Morales MD  8:52 AM  3/17/2019

## 2019-03-17 NOTE — CONSULTS
[facility]  Dr. Sakshi Bravo M.D., Dr. Jelly Olvera M.D., Dr. Myah Baxter D.O., Dr. Dk Garcia M.D., Dr. Dawna Hernandez D.O. Patient Name: Annika Bansal  MRN: [id number]  : 1985 (29 y.o. male)  Allergies: is allergic to gabapentin; percocet [oxycodone-acetaminophen]; and vicodin [hydrocodone-acetaminophen]. Date of Service: 3/17/2019       Reason for Consultation:  N/V, dilated stomach    HISTORY OF PRESENT ILLNESS:      The patient is a 29 y.o. male who presents with from home with acute onset epigastric pain, fever, chills, nausea, vomiting and diarrhea. Patient states that he was awoken from sleep yesterday morning with severe pain in his epigastric region of his abdomen. The pain was sharp and stabbing without radiation. There was associated fevers and chills. This was followed by unremitting nausea with emesis and diarrhea. Denies hematochezia, melena and hematemesis. His symptoms continued throughout the day which prompted his presentation to the ED. Upon initial evaluation, patient underwent CT imaging of his abdomen which found dilated stomach with passage of oral contrast through the small bowel, small bowel dilation with thickening and liquid stool in colon. Patient was admitted and is currently feeling better since presentation and was eating a sandwich for lunch at time of my assessment. Of note, patient has no history of inflammatory bowel disease, chronic abdominal pain/nausea/vomiting or weight loss. He admits to being in contact with sick patients recently who had similar symptoms and a patient with C diff last week. Denies new medications or recent travel. No personal or family history of IBD. Denies NSAID use. No history of EGD or colonoscopy prior. Of note, patient has history of cryptosporidium and was followed by ID with negative HIV testing x 2 at follow up. REVIEW OF SYSTEMS:   Constitutional: Denies unintentional weight loss.  Admits to fever and (SINGULAIR) 10 MG tablet take 1 tablet by mouth once daily 1/15/19  Yes Asim Evans DO   busPIRone (BUSPAR) 10 MG tablet take 1 tablet by mouth three times a day if needed for anxiety  Patient taking differently: take 1 tablet by mouth two times a day if needed for anxiety 11/16/18  Yes ARLEN Enrique CNP   buPROPion (WELLBUTRIN XL) 150 MG extended release tablet Take 150 mg by mouth every morning   Yes Historical Provider, MD   Multiple Vitamins-Minerals (THERAPEUTIC MULTIVITAMIN-MINERALS) tablet Take 1 tablet by mouth daily   Yes Historical Provider, MD   Icosapent Ethyl (VASCEPA) 1 g CAPS capsule Take 2 capsules by mouth 2 times daily 3/5/19   ARLEN Enrique CNP   hydrocortisone (ANUSOL-HC) 2.5 % rectal cream Place rectally 2 times daily.  7/20/18   ARLEN Ivey CNP       Allergies: Gabapentin; Percocet [oxycodone-acetaminophen]; and Vicodin [hydrocodone-acetaminophen]    Social History:  Social History     Socioeconomic History    Marital status:      Spouse name: Not on file    Number of children: Not on file    Years of education: Not on file    Highest education level: Not on file   Occupational History    Not on file   Social Needs    Financial resource strain: Not on file    Food insecurity:     Worry: Not on file     Inability: Not on file    Transportation needs:     Medical: Not on file     Non-medical: Not on file   Tobacco Use    Smoking status: Never Smoker    Smokeless tobacco: Never Used   Substance and Sexual Activity    Alcohol use: Yes     Comment: rarely    Drug use: Never    Sexual activity: Not on file   Lifestyle    Physical activity:     Days per week: Not on file     Minutes per session: Not on file    Stress: Not on file   Relationships    Social connections:     Talks on phone: Not on file     Gets together: Not on file     Attends [employer] service: Not on file     Active member of club or organization: Not on file     Attends meetings of clubs or organizations: Not on file     Relationship status: Not on file    Intimate partner violence:     Fear of current or ex partner: Not on file     Emotionally abused: Not on file     Physically abused: Not on file     Forced sexual activity: Not on file   Other Topics Concern    Not on file   Social History Narrative    Not on file       Family History:  History reviewed. No pertinent family history. PHYSICAL EXAM:  Vital Signs: /78   Pulse 91   Temp 97.8 °F (36.6 °C) (Oral)   Resp 16   Ht 5' 11\" (1.803 m)   Wt 237 lb (107.5 kg)   SpO2 97%   BMI 33.05 kg/m²   GENERAL APPEARANCE:  awake, alert, oriented, cooperative, and in no acute distress  EYES:  Lids and lashes normal, PERRLA, EOMI, sclera clear, conjunctiva normal  HENT:  Normocephalic, without obvious abnormality, atramatic, oral pharynx with moist mucus membranes, tonsils without erythema or exudates  NECK:  Supple with no carotid bruits, JVD or thyromegaly. No cervical adenopathy  LUNGS:  Clear to auscultation bilaterally with no wheezes, rales or rhonchi. No increased work of breathing, good air exchange. CARDIOVASCULAR: Regular rate and rhythm, no murmur  ABDOMEN:  Hyperactive bowel sounds in all four quadrants, soft, non-distended, tenderness in the epigastrium, no masses palpated, no hepatosplenomegally  MUSCULOSKELETAL:  There is no redness, warmth, or swelling of the joints. Full range of motion noted. Motor strength is 5 out of 5 all extremities bilaterally. Tone is normal.  EXTREMITIES: No edema, 2+ pulses bilaterally (radial and dorsalis pedis)  NEUROLOGIC:  Awake, alert, oriented to name, place and time. Cranial nerves II-XII are grossly intact. Motor is 5 out of 5 bilaterally. SKIN: Normal skin color, texture, and turgor. There is no redness, warmth, or swelling. No bruising or bleeding, no mottling. PSYCH: Affect, behavior and insight are all within normal limits.       DATA:  Results for orders Negative    Leukocyte Esterase, Urine Negative Negative   CBC   Result Value Ref Range    WBC 5.3 4.5 - 11.5 E9/L    RBC 5.01 3.80 - 5.80 E12/L    Hemoglobin 14.6 12.5 - 16.5 g/dL    Hematocrit 41.8 37.0 - 54.0 %    MCV 83.4 80.0 - 99.9 fL    MCH 29.1 26.0 - 35.0 pg    MCHC 34.9 (H) 32.0 - 34.5 %    RDW 12.3 11.5 - 15.0 fL    Platelets 773 814 - 649 E9/L    MPV 10.1 7.0 - 12.0 fL   Comprehensive Metabolic Panel w/ Reflex to MG   Result Value Ref Range    Sodium 136 132 - 146 mmol/L    Potassium reflex Magnesium 3.8 3.5 - 5.0 mmol/L    Chloride 101 98 - 107 mmol/L    CO2 24 22 - 29 mmol/L    Anion Gap 11 7 - 16 mmol/L    Glucose 125 (H) 74 - 99 mg/dL    BUN 15 6 - 20 mg/dL    CREATININE 1.0 0.7 - 1.2 mg/dL    GFR Non-African American >60 >=60 mL/min/1.73    GFR African American >60     Calcium 8.8 8.6 - 10.2 mg/dL    Total Protein 6.2 (L) 6.4 - 8.3 g/dL    Alb 3.7 3.5 - 5.2 g/dL    Total Bilirubin 0.5 0.0 - 1.2 mg/dL    Alkaline Phosphatase 56 40 - 129 U/L    ALT 24 0 - 40 U/L    AST 15 0 - 39 U/L   Lipase   Result Value Ref Range    Lipase 15 13 - 60 U/L         IMAGING:  Ct Abdomen Pelvis W Iv Contrast Additional Contrast? Oral    Result Date: 3/16/2019  Location:200 Exam: CT ABDOMEN PELVIS W IV CONTRAST Indications: Diffuse abdominal guarding. Nausea, vomiting, diarrhea. Patient symptoms started at 4:00 AM. Previous lumbar spine surgery  Technique: Automated dose exposure control was used for this exam. Multiple serial axial images through the abdomen and pelvis were obtained, following intravenous and oral contrast administration. 80 cc of Isovue-370 was administered intravenously for this exam. Patient ingested 50 cc of Omnipaque 240 for this exam. Computer reconstructed images were obtained in the sagittal and coronal projections. Findings: Comparison: Previous study dated 2/28/2018 was reviewed. . Images through the lung bases do not demonstrate very mild bilateral lower lobe dependent subsegmental atelectasis. The liver, spleen, adrenals, pancreas, and gallbladder are unremarkable. No  renal masses or hydronephrosis are seen. Bilateral symmetrical nephrograms are seen. Urinary bladder is partially distended with fluid. No urinary bladder calculi are seen. No free intra-abdominal or intrapelvic air or fluid is identified. No abnormal soft tissue stranding of the mesenteric fat is seen throughout the abdomen or pelvis. No abdominal aortic aneurysm or bulky retroperitoneal adenopathy is seen. Most of the oral contrast is located within the stomach, which is moderately distended. There is contrast noted within the distended distal thoracic esophagus and within a few moderately distended loops of bowel within the left side of the abdomen and upper pelvis. The distended small bowel loops measure up to 3.2 cm in diameter. Findings suggesting segmental circumferential thickening of the wall of the distended small bowel loop are noted. There is fluid density within the cecum and adjacent ascending colon as well as within the rectum. Appendix is discretely identified, contains air, is not dilated, and is unremarkable, as visualized. Oral contrast has not yet reached the colon or the left-sided small bowel loops. There is evidence of previous surgery of the lower lumbar spine, involving L4 and L5, with posterior orthopedic screws entering the L4 and L5 vertebra bilaterally. There is evidence of interdisc surgery at L4-L5. Endplate sclerosis adjacent to the L4-L5 intervertebral disc is similar to previous exam.     1. Most of oral contrast is located within the stomach, which is moderately distended. Visualization of contrast within the small bowel more distally excludes complete small bowel obstruction and findings raise the question of gastroparesis versus partial gastric outlet obstruction. Clinical correlation is recommended.  2. Contrast within distended distal thoracic esophagus, which may be secondary to moderately distended state of the stomach. 3. Moderately distended small bowel loops within left side of abdomen and upper pelvis, with findings suggesting nonspecific segmental circumferential thickening of these dilated small bowel loops. Question infectious or inflammatory process. 4. Appearance of appendix is visualized on this examination and is unremarkable. 5. Fluid within portions of colon and rectum, consistent with patient history of diarrhea        ASSESSMENT/PLAN:      1) Abdominal pain, Nausea, vomiting  - suspect acute viral gastroenteritis combined with gastroparesis, doubt IBD or GOO as this was acute in onset and associated with fevers, chills and sick contacts  - will switch to clear liquid diet and maintain NPO after midnight  - start IV PPI BID to cover for possible PUD  - check ESR/CRP  - EGD with Dr. Carpio Presume tomorrow to assess for GOO  - HIDA per gen surg  - continue supportive care  - limit narcotics as this will slow gastric transit time    2) Diarrhea, acute  - suspect acute viral etiology  - check ESR, CRP and stool studies as ordered  - no indication for abx at this time  - continue supportive care with IV fluids    Above recommendations are tentative at this time. Case will be discussed with attending physician. Chuck Valadez D.O.   GI fellow  3/17/2019 at 12:26 PM    I was present at bedside and performed my own exam.  I agree with the above findings and plan. Pt with abd distention and significant epigastric pain. Significant other at bedside as well. Pt concerned as still having a lot of regurgitation. No bleeding. Ct reviewed. Will have NG put down to LIS, plan for EGD tomorrow as patient ate today. Question of partial gastric outlet obstruction vs gastroparesis process. Will put in for US prior to HIDA with CCK to R/O stones/sludge. Stool studies sent for diarrhea, question is infectious vs inflammatory etiology at work here.   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 See orders, our service will follow.     DO [place]  3/17/2019  10:30 PM

## 2019-03-17 NOTE — PLAN OF CARE
Problem: Activity:  Goal: Risk for activity intolerance will decrease  Description  Risk for activity intolerance will decrease  3/17/2019 1349 by Jacqueline Mancini RN  Outcome: Met This Shift  3/17/2019 0332 by Steff Gorman RN  Outcome: Met This Shift     Problem:  Bowel/Gastric:  Goal: Bowel function will improve  Description  Bowel function will improve  3/17/2019 1349 by Jacqueline Mnacini RN  Outcome: Met This Shift  3/17/2019 0332 by Steff Gorman RN  Outcome: Met This Shift  Goal: Diagnostic test results will improve  Description  Diagnostic test results will improve  Outcome: Met This Shift  Goal: Occurrences of nausea will decrease  Description  Occurrences of nausea will decrease  Outcome: Met This Shift  Goal: Occurrences of vomiting will decrease  Description  Occurrences of vomiting will decrease  Outcome: Met This Shift     Problem: Fluid Volume:  Goal: Maintenance of adequate hydration will improve  Description  Maintenance of adequate hydration will improve  Outcome: Met This Shift     Problem: Health Behavior:  Goal: Ability to state signs and symptoms to report to health care provider will improve  Description  Ability to state signs and symptoms to report to health care provider will improve  Outcome: Met This Shift     Problem: Physical Regulation:  Goal: Complications related to the disease process, condition or treatment will be avoided or minimized  Description  Complications related to the disease process, condition or treatment will be avoided or minimized  Outcome: Met This Shift  Goal: Ability to maintain clinical measurements within normal limits will improve  Description  Ability to maintain clinical measurements within normal limits will improve  Outcome: Met This Shift     Problem: Sensory:  Goal: Ability to identify factors that increase the pain will improve  Description  Ability to identify factors that increase the pain will improve  Outcome: Met This Shift  Goal: Ability to notify healthcare provider of pain before it becomes unmanageable or unbearable will improve  Description  Ability to notify healthcare provider of pain before it becomes unmanageable or unbearable will improve  Outcome: Met This Shift  Goal: Pain level will decrease  Description  Pain level will decrease  Outcome: Met This Shift     Problem: Pain:  Goal: Pain level will decrease  Description  Pain level will decrease  Outcome: Met This Shift  Goal: Control of acute pain  Description  Control of acute pain  Outcome: Met This Shift

## 2019-03-18 ENCOUNTER — ANESTHESIA (OUTPATIENT)
Dept: ENDOSCOPY | Age: 34
DRG: 241 | End: 2019-03-18
Payer: MEDICAID

## 2019-03-18 ENCOUNTER — ANESTHESIA EVENT (OUTPATIENT)
Dept: ENDOSCOPY | Age: 34
DRG: 241 | End: 2019-03-18
Payer: MEDICAID

## 2019-03-18 ENCOUNTER — APPOINTMENT (OUTPATIENT)
Dept: NUCLEAR MEDICINE | Age: 34
DRG: 241 | End: 2019-03-18
Payer: MEDICAID

## 2019-03-18 VITALS
TEMPERATURE: 98 F | HEART RATE: 82 BPM | BODY MASS INDEX: 33.18 KG/M2 | OXYGEN SATURATION: 97 % | RESPIRATION RATE: 18 BRPM | WEIGHT: 237 LBS | HEIGHT: 71 IN | SYSTOLIC BLOOD PRESSURE: 125 MMHG | DIASTOLIC BLOOD PRESSURE: 82 MMHG

## 2019-03-18 VITALS
DIASTOLIC BLOOD PRESSURE: 70 MMHG | OXYGEN SATURATION: 98 % | RESPIRATION RATE: 23 BRPM | SYSTOLIC BLOOD PRESSURE: 120 MMHG

## 2019-03-18 LAB
ALBUMIN SERPL-MCNC: 3.5 G/DL (ref 3.5–5.2)
ALP BLD-CCNC: 52 U/L (ref 40–129)
ALT SERPL-CCNC: 17 U/L (ref 0–40)
ANION GAP SERPL CALCULATED.3IONS-SCNC: 11 MMOL/L (ref 7–16)
AST SERPL-CCNC: 14 U/L (ref 0–39)
BASOPHILS ABSOLUTE: 0.01 E9/L (ref 0–0.2)
BASOPHILS RELATIVE PERCENT: 0.3 % (ref 0–2)
BILIRUB SERPL-MCNC: 0.3 MG/DL (ref 0–1.2)
BUN BLDV-MCNC: 12 MG/DL (ref 6–20)
CALCIUM SERPL-MCNC: 8.8 MG/DL (ref 8.6–10.2)
CHLORIDE BLD-SCNC: 104 MMOL/L (ref 98–107)
CO2: 27 MMOL/L (ref 22–29)
CREAT SERPL-MCNC: 1 MG/DL (ref 0.7–1.2)
EOSINOPHILS ABSOLUTE: 0.07 E9/L (ref 0.05–0.5)
EOSINOPHILS RELATIVE PERCENT: 1.9 % (ref 0–6)
GFR AFRICAN AMERICAN: >60
GFR NON-AFRICAN AMERICAN: >60 ML/MIN/1.73
GIARDIA ANTIGEN STOOL: NORMAL
GLUCOSE BLD-MCNC: 94 MG/DL (ref 74–99)
HCT VFR BLD CALC: 39.4 % (ref 37–54)
HEMOGLOBIN: 13.7 G/DL (ref 12.5–16.5)
IMMATURE GRANULOCYTES #: 0.01 E9/L
IMMATURE GRANULOCYTES %: 0.3 % (ref 0–5)
LYMPHOCYTES ABSOLUTE: 1.03 E9/L (ref 1.5–4)
LYMPHOCYTES RELATIVE PERCENT: 27.7 % (ref 20–42)
MCH RBC QN AUTO: 29.7 PG (ref 26–35)
MCHC RBC AUTO-ENTMCNC: 34.8 % (ref 32–34.5)
MCV RBC AUTO: 85.5 FL (ref 80–99.9)
MONOCYTES ABSOLUTE: 0.42 E9/L (ref 0.1–0.95)
MONOCYTES RELATIVE PERCENT: 11.3 % (ref 2–12)
NEUTROPHILS ABSOLUTE: 2.18 E9/L (ref 1.8–7.3)
NEUTROPHILS RELATIVE PERCENT: 58.5 % (ref 43–80)
ORGANISM: ABNORMAL
PDW BLD-RTO: 12.3 FL (ref 11.5–15)
PLATELET # BLD: 171 E9/L (ref 130–450)
PMV BLD AUTO: 10.2 FL (ref 7–12)
POTASSIUM SERPL-SCNC: 3.9 MMOL/L (ref 3.5–5)
RBC # BLD: 4.61 E12/L (ref 3.8–5.8)
ROTAVIRUS ANTIGEN: NORMAL
SODIUM BLD-SCNC: 142 MMOL/L (ref 132–146)
TOTAL PROTEIN: 5.9 G/DL (ref 6.4–8.3)
WBC # BLD: 3.7 E9/L (ref 4.5–11.5)
WHITE BLOOD CELLS (WBC), STOOL: NORMAL

## 2019-03-18 PROCEDURE — 2580000003 HC RX 258: Performed by: RADIOLOGY

## 2019-03-18 PROCEDURE — 6360000004 HC RX CONTRAST MEDICATION: Performed by: RADIOLOGY

## 2019-03-18 PROCEDURE — 0DB98ZX EXCISION OF DUODENUM, VIA NATURAL OR ARTIFICIAL OPENING ENDOSCOPIC, DIAGNOSTIC: ICD-10-PCS | Performed by: SURGERY

## 2019-03-18 PROCEDURE — 2580000003 HC RX 258: Performed by: NURSE ANESTHETIST, CERTIFIED REGISTERED

## 2019-03-18 PROCEDURE — 6370000000 HC RX 637 (ALT 250 FOR IP): Performed by: SURGERY

## 2019-03-18 PROCEDURE — 80053 COMPREHEN METABOLIC PANEL: CPT

## 2019-03-18 PROCEDURE — 6360000002 HC RX W HCPCS: Performed by: SURGERY

## 2019-03-18 PROCEDURE — 78227 HEPATOBIL SYST IMAGE W/DRUG: CPT

## 2019-03-18 PROCEDURE — 2709999900 HC NON-CHARGEABLE SUPPLY: Performed by: INTERNAL MEDICINE

## 2019-03-18 PROCEDURE — 94150 VITAL CAPACITY TEST: CPT

## 2019-03-18 PROCEDURE — 3609017100 HC EGD: Performed by: INTERNAL MEDICINE

## 2019-03-18 PROCEDURE — 88305 TISSUE EXAM BY PATHOLOGIST: CPT

## 2019-03-18 PROCEDURE — G0378 HOSPITAL OBSERVATION PER HR: HCPCS

## 2019-03-18 PROCEDURE — 7100000010 HC PHASE II RECOVERY - FIRST 15 MIN: Performed by: INTERNAL MEDICINE

## 2019-03-18 PROCEDURE — 3430000000 HC RX DIAGNOSTIC RADIOPHARMACEUTICAL: Performed by: RADIOLOGY

## 2019-03-18 PROCEDURE — 6360000002 HC RX W HCPCS: Performed by: STUDENT IN AN ORGANIZED HEALTH CARE EDUCATION/TRAINING PROGRAM

## 2019-03-18 PROCEDURE — 99239 HOSP IP/OBS DSCHRG MGMT >30: CPT | Performed by: SURGERY

## 2019-03-18 PROCEDURE — C9113 INJ PANTOPRAZOLE SODIUM, VIA: HCPCS | Performed by: STUDENT IN AN ORGANIZED HEALTH CARE EDUCATION/TRAINING PROGRAM

## 2019-03-18 PROCEDURE — A9537 TC99M MEBROFENIN: HCPCS | Performed by: RADIOLOGY

## 2019-03-18 PROCEDURE — 86703 HIV-1/HIV-2 1 RESULT ANTBDY: CPT

## 2019-03-18 PROCEDURE — 3609012400 HC EGD TRANSORAL BIOPSY SINGLE/MULTIPLE: Performed by: INTERNAL MEDICINE

## 2019-03-18 PROCEDURE — 3700000001 HC ADD 15 MINUTES (ANESTHESIA): Performed by: INTERNAL MEDICINE

## 2019-03-18 PROCEDURE — 96376 TX/PRO/DX INJ SAME DRUG ADON: CPT

## 2019-03-18 PROCEDURE — 3700000000 HC ANESTHESIA ATTENDED CARE: Performed by: INTERNAL MEDICINE

## 2019-03-18 PROCEDURE — 6360000002 HC RX W HCPCS: Performed by: NURSE ANESTHETIST, CERTIFIED REGISTERED

## 2019-03-18 PROCEDURE — 7100000011 HC PHASE II RECOVERY - ADDTL 15 MIN: Performed by: INTERNAL MEDICINE

## 2019-03-18 PROCEDURE — 0DB68ZX EXCISION OF STOMACH, VIA NATURAL OR ARTIFICIAL OPENING ENDOSCOPIC, DIAGNOSTIC: ICD-10-PCS | Performed by: SURGERY

## 2019-03-18 PROCEDURE — 2580000003 HC RX 258: Performed by: EMERGENCY MEDICINE

## 2019-03-18 PROCEDURE — 88342 IMHCHEM/IMCYTCHM 1ST ANTB: CPT

## 2019-03-18 PROCEDURE — 2580000003 HC RX 258: Performed by: SURGERY

## 2019-03-18 PROCEDURE — 85025 COMPLETE CBC W/AUTO DIFF WBC: CPT

## 2019-03-18 PROCEDURE — 96365 THER/PROPH/DIAG IV INF INIT: CPT

## 2019-03-18 PROCEDURE — 36415 COLL VENOUS BLD VENIPUNCTURE: CPT

## 2019-03-18 RX ORDER — SODIUM CHLORIDE 9 MG/ML
INJECTION, SOLUTION INTRAVENOUS CONTINUOUS PRN
Status: DISCONTINUED | OUTPATIENT
Start: 2019-03-18 | End: 2019-03-18 | Stop reason: SDUPTHER

## 2019-03-18 RX ORDER — PROPOFOL 10 MG/ML
INJECTION, EMULSION INTRAVENOUS PRN
Status: DISCONTINUED | OUTPATIENT
Start: 2019-03-18 | End: 2019-03-18 | Stop reason: SDUPTHER

## 2019-03-18 RX ORDER — NITAZOXANIDE 500 MG/1
500 TABLET ORAL EVERY 12 HOURS SCHEDULED
Status: DISCONTINUED | OUTPATIENT
Start: 2019-03-18 | End: 2019-03-18 | Stop reason: HOSPADM

## 2019-03-18 RX ORDER — PANTOPRAZOLE SODIUM 40 MG/1
40 TABLET, DELAYED RELEASE ORAL
Status: DISCONTINUED | OUTPATIENT
Start: 2019-03-19 | End: 2019-03-18 | Stop reason: HOSPADM

## 2019-03-18 RX ADMIN — PROPOFOL 200 MG: 10 INJECTION, EMULSION INTRAVENOUS at 16:27

## 2019-03-18 RX ADMIN — Medication 10 ML: at 10:00

## 2019-03-18 RX ADMIN — PANTOPRAZOLE SODIUM 40 MG: 40 INJECTION, POWDER, FOR SOLUTION INTRAVENOUS at 10:00

## 2019-03-18 RX ADMIN — SODIUM CHLORIDE 2.15 MCG: 9 INJECTION, SOLUTION INTRAVENOUS at 08:43

## 2019-03-18 RX ADMIN — SODIUM CHLORIDE: 9 INJECTION, SOLUTION INTRAVENOUS at 16:20

## 2019-03-18 RX ADMIN — KETOROLAC TROMETHAMINE 30 MG: 30 INJECTION, SOLUTION INTRAMUSCULAR; INTRAVENOUS at 11:50

## 2019-03-18 RX ADMIN — SODIUM CHLORIDE: 9 INJECTION, SOLUTION INTRAVENOUS at 11:20

## 2019-03-18 RX ADMIN — KETOROLAC TROMETHAMINE 30 MG: 30 INJECTION, SOLUTION INTRAMUSCULAR; INTRAVENOUS at 03:10

## 2019-03-18 RX ADMIN — Medication 6 MILLICURIE: at 07:21

## 2019-03-18 RX ADMIN — HYDROCODONE BITARTRATE AND ACETAMINOPHEN 1 TABLET: 5; 325 TABLET ORAL at 12:56

## 2019-03-18 ASSESSMENT — PAIN DESCRIPTION - FREQUENCY
FREQUENCY: CONTINUOUS
FREQUENCY: CONTINUOUS

## 2019-03-18 ASSESSMENT — PAIN SCALES - GENERAL
PAINLEVEL_OUTOF10: 6
PAINLEVEL_OUTOF10: 0
PAINLEVEL_OUTOF10: 5
PAINLEVEL_OUTOF10: 6
PAINLEVEL_OUTOF10: 6
PAINLEVEL_OUTOF10: 0
PAINLEVEL_OUTOF10: 7

## 2019-03-18 ASSESSMENT — PAIN DESCRIPTION - PROGRESSION
CLINICAL_PROGRESSION: GRADUALLY IMPROVING
CLINICAL_PROGRESSION: GRADUALLY WORSENING

## 2019-03-18 ASSESSMENT — PAIN DESCRIPTION - PAIN TYPE
TYPE: ACUTE PAIN
TYPE: ACUTE PAIN

## 2019-03-18 ASSESSMENT — PAIN DESCRIPTION - ORIENTATION
ORIENTATION: MID
ORIENTATION: RIGHT;LEFT

## 2019-03-18 ASSESSMENT — PAIN - FUNCTIONAL ASSESSMENT: PAIN_FUNCTIONAL_ASSESSMENT: ACTIVITIES ARE NOT PREVENTED

## 2019-03-18 ASSESSMENT — PAIN DESCRIPTION - DESCRIPTORS
DESCRIPTORS: ACHING;DISCOMFORT;DULL
DESCRIPTORS: ITCHING;SORE

## 2019-03-18 ASSESSMENT — PAIN DESCRIPTION - LOCATION
LOCATION: ABDOMEN;THROAT
LOCATION: THROAT

## 2019-03-18 ASSESSMENT — PAIN DESCRIPTION - ONSET
ONSET: ON-GOING
ONSET: ON-GOING

## 2019-03-18 NOTE — PROGRESS NOTES
Surgery Progress Note            Chief complaint:   Patient Active Problem List   Diagnosis    Hypercholesteremia    Diffuse abdominal pain       S: no acute changes    O:   Vitals:    03/18/19 1150   BP:    Pulse:    Resp:    Temp:    SpO2: 96%       Intake/Output Summary (Last 24 hours) at 3/18/2019 1339  Last data filed at 3/18/2019 1225  Gross per 24 hour   Intake 6000.41 ml   Output 3700 ml   Net 2300.41 ml           Labs:  Recent Labs     03/16/19  1045 03/17/19  0337 03/18/19  0350   WBC 11.3 5.3 3.7*   HGB 16.9* 14.6 13.7   HCT 47.6 41.8 39.4     Lab Results   Component Value Date    CREATININE 1.0 03/18/2019    BUN 12 03/18/2019     03/18/2019    K 3.9 03/18/2019     03/18/2019    CO2 27 03/18/2019     Recent Labs     03/16/19  1045 03/17/19  0337   LIPASE 19 15       Physical exam:   /72   Pulse 94   Temp 98 °F (36.7 °C) (Oral)   Resp 20   Ht 5' 11\" (1.803 m)   Wt 237 lb (107.5 kg)   SpO2 96%   BMI 33.05 kg/m²   General appearance: NAD  Head: NCAT  Neck: supple, no masses  Lungs: equal chest rise bilateral  Heart: S1S2 present  Abdomen: soft, nontender, nondistended  Skin; no lesions  Gu: no cva tenderness  Extremities: extremities normal, atraumatic, no cyanosis or edema    A:  Diffuse abdominal pain    P: discussed the ef of 13% in gb and he wishes to wait on surgery now, he will get treatment for crypto and get endo today    Cleo Severino MD  3/18/2019

## 2019-03-18 NOTE — PROCEDURES
Jeanine Pak is a 29 y.o. male patient. 1. Generalized abdominal pain    2. Nausea and vomiting, intractability of vomiting not specified, unspecified vomiting type      Past Medical History:   Diagnosis Date    Allergic rhinitis     Anxiety     Chronic back pain     Depression     Hyperlipidemia     Neuropathy      Blood pressure 130/72, pulse 94, temperature 98 °F (36.7 °C), temperature source Oral, resp. rate 20, height 5' 11\" (1.803 m), weight 237 lb (107.5 kg), SpO2 96 %. Procedures    Procedure:  Esophagogastroduodenoscopy    Indication:  Nausea, vomiting, abdominal pain, abnormal CT    Sedation:  MAC      Endoscope was advanced easily through mouth to second portion of duodenum      Oropharynx views are limited but grossly normal.    Esophagus:   Mucosa is normal.  GEJ at 40 cm. Stomach:   Antrum showed gastritis. Biopsies taken for H. Pylori. No evidence of gastric outlet obstruction. Gastric body is normal.    Retroflexed views show normal fundus and cardia. Duodenum: Bulb showed duodenitis. Second portion of duodenum showed duodenitis. Biopsies taken for Celiacs. IMPRESSION AND PLAN:     1. Gastritis. Biopsies taken for H. Pylori. 2. Duodenitis of the bulb and second portion. Biopsies taken for Celiac. 3. Ok for PPI once daily. [address] for regular diet. Follow up as outpatient in office, call [phone] to schedule for appointment. Pt was seen and procedure was performed with Dr. Franc Vitale present for the entire procedure. Dario Patten DO  3/18/2019      I was present for entire duration of procedure and participated in key and non-key portions. Discussed findings with the fellow and agree with recommendations above.     Sky Laguna DO  3/18/2019  4:35 PM

## 2019-03-18 NOTE — PLAN OF CARE
Problem: Physical Regulation:  Goal: Complications related to the disease process, condition or treatment will be avoided or minimized  Description  Complications related to the disease process, condition or treatment will be avoided or minimized  3/17/2019 2019 by Tameka Taylor RN  Outcome: Met This Shift     Problem: Physical Regulation:  Goal: Ability to maintain clinical measurements within normal limits will improve  Description  Ability to maintain clinical measurements within normal limits will improve  3/17/2019 2019 by Tameka Taylor RN  Outcome: Met This Shift     Problem: Pain:  Goal: Control of acute pain  Description  Control of acute pain  3/17/2019 2019 by Tameka Taylor RN  Outcome: Met This Shift

## 2019-03-18 NOTE — PLAN OF CARE
Patient seen and examined. No new complaints  Discussed upper endoscopy - all questions answered to his satisfaction. Also discussed cryptosporidium infection - ? Gallbladder involvement. Will defer further management of gallbladder issues to surgical service. Prescription for Alinia placed on chart. Plan to proceed with upper endoscopy as scheduled.     Duane Alt, MD  3/18/2019  2:55 PM

## 2019-03-18 NOTE — PROGRESS NOTES
P Quality Flow/Interdisciplinary Rounds Progress Note        Quality Flow Rounds held on March 18, 2019    Disciplines Attending:  Bedside Nurse, ,  and Nursing Unit Leadership    Gayatri Alejo was admitted on 3/16/2019 10:10 AM    Anticipated Discharge Date:  Expected Discharge Date: 03/18/19    Disposition:    Ryan Score:  Ryan Scale Score: 23    Readmission Risk              Risk of Unplanned Readmission:        5           Discussed patient goal for the day, patient clinical progression, and barriers to discharge.   The following Goal(s) of the Day/Commitment(s) have been identified:  EGD planned for today      [facility]  March 18, 2019

## 2019-03-18 NOTE — H&P
H&P reviewed. Changes below. /72   Pulse 94   Temp 98 °F (36.7 °C) (Oral)   Resp 20   Ht 5' 11\" (1.803 m)   Wt 237 lb (107.5 kg)   SpO2 96%   BMI 33.05 kg/m²       Patient seen and examined today at bedside. Feeling better. Still has abdominal pain. Denies n/v. Diarrhea is still loose. Cryptosporidium antigen test positive in stool. Called lab and the EIA test is confirmatory without any further testing since it was over 1 year ago he was diagnosed. Infectious disease control notified and patient does not need to be in contact precautions. Will start on Nitazoxanide 500mg PO BID x 3 days. Patient has agreed to be tested for HIV now. EGD scheduled for this afternoon.     Guilherme Clarke DO  GI Fellow  3/18/19  12:08

## 2019-03-18 NOTE — PLAN OF CARE
Problem:  Activity:  Goal: Risk for activity intolerance will decrease  Description  Risk for activity intolerance will decrease  Outcome: Met This Shift     Problem: Fluid Volume:  Goal: Maintenance of adequate hydration will improve  Description  Maintenance of adequate hydration will improve  Outcome: Met This Shift     Problem: Physical Regulation:  Goal: Complications related to the disease process, condition or treatment will be avoided or minimized  Description  Complications related to the disease process, condition or treatment will be avoided or minimized  3/18/2019 0524 by Divine Vega RN  Outcome: Met This Shift  3/17/2019 2019 by Jessica Landon RN  Outcome: Met This Shift  Goal: Ability to maintain clinical measurements within normal limits will improve  Description  Ability to maintain clinical measurements within normal limits will improve  3/18/2019 0524 by Divine Vega RN  Outcome: Met This Shift  3/17/2019 2019 by Jessica Landon RN  Outcome: Met This Shift     Problem: Pain:  Goal: Control of acute pain  Description  Control of acute pain  3/18/2019 0524 by Divine Vega RN  Outcome: Met This Shift  3/17/2019 2019 by Jessica Landon RN  Outcome: Met This Shift

## 2019-03-19 DIAGNOSIS — E78.1 HYPERTRIGLYCERIDEMIA: Primary | ICD-10-CM

## 2019-03-19 LAB
CULTURE, STOOL: NORMAL
HIV-1 AND HIV-2 ANTIBODIES: NORMAL

## 2019-03-19 RX ORDER — OMEGA-3-ACID ETHYL ESTERS 1 G/1
2 CAPSULE, LIQUID FILLED ORAL 2 TIMES DAILY
Qty: 120 CAPSULE | Refills: 5 | Status: SHIPPED | OUTPATIENT
Start: 2019-03-19 | End: 2019-05-31

## 2019-03-19 NOTE — DISCHARGE SUMMARY
Physician Discharge Summary     Gentry Palomino  [id number]    Admit date: 3/16/2019    Discharge date and time: 3/18/2019  7:27 PM     Admitting Physician: Corinne Lyon, MD     Admission Diagnoses: Diffuse abdominal pain [R10.84]    Condition at discharge: stable   Ernesto Browne   Home Medication Instructions [id number]    Printed on:03/19/19 0975   Medication Information                      buPROPion (WELLBUTRIN XL) 150 MG extended release tablet  Take 150 mg by mouth every morning             busPIRone (BUSPAR) 10 MG tablet  take 1 tablet by mouth three times a day if needed for anxiety             Cholecalciferol (VITAMIN D3) 5000 units TABS  Take by mouth daily             hydrocortisone (ANUSOL-HC) 2.5 % rectal cream  Place rectally 2 times daily. Icosapent Ethyl (VASCEPA) 1 g CAPS capsule  Take 2 capsules by mouth 2 times daily             montelukast (SINGULAIR) 10 MG tablet  take 1 tablet by mouth once daily             Multiple Vitamins-Minerals (THERAPEUTIC MULTIVITAMIN-MINERALS) tablet  Take 1 tablet by mouth daily             vitamin D (ERGOCALCIFEROL) 32828 units CAPS capsule  Take 1 capsule by mouth Twice a Week Take 50,000 IU every Wednesday and Sunday and 2,000 IU other 5 days/week                   Hospital Course: Had uncomplicated egd and was found to have gastritis and stool studies showed cyrpto infection and was treated for this and hida showed dyskinesia but he did not want surgery at this time and uncomplicated hospital course and was discharged tolerating a general diet, having good bowel function, ambulating independently and with adequate analgesia.     Discharge Exam: /82   Pulse 82   Temp 98 °F (36.7 °C) (Oral)   Resp 18   Ht 5' 11\" (1.803 m)   Wt 237 lb (107.5 kg)   SpO2 97%   BMI 33.05 kg/m²     General appearance: alert, appears stated age and cooperative  Head: Normocephalic, without obvious abnormality, atraumatic  Neck: no adenopathy, no carotid bruit, no JVD, supple, symmetrical, trachea midline and thyroid not enlarged, symmetric, no tenderness/mass/nodules  Lungs: clear to auscultation bilaterally  Heart: regular rate and rhythm, S1, S2 normal, no murmur, click, rub or gallop  Abdomen: soft, non-tender; bowel sounds normal; no masses,  no organomegaly and incisions clean and dry  Extremities: extremities normal, atraumatic, no cyanosis or edema      Disposition: home    Patient Instructions: Activity: activity as tolerated  Diet: regular diet    Follow-up with Dr. Sussy Harper in 2 weeks.     Signed:  Tiana Durán  3/19/2019  9:15 AM

## 2019-05-15 DIAGNOSIS — J30.1 CHRONIC SEASONAL ALLERGIC RHINITIS DUE TO POLLEN: ICD-10-CM

## 2019-05-15 RX ORDER — MONTELUKAST SODIUM 10 MG/1
TABLET ORAL
Qty: 30 TABLET | Refills: 3 | Status: SHIPPED | OUTPATIENT
Start: 2019-05-15

## 2019-05-31 ENCOUNTER — OFFICE VISIT (OUTPATIENT)
Dept: FAMILY MEDICINE CLINIC | Age: 34
End: 2019-05-31
Payer: MEDICAID

## 2019-05-31 VITALS
DIASTOLIC BLOOD PRESSURE: 72 MMHG | HEART RATE: 133 BPM | HEIGHT: 71 IN | SYSTOLIC BLOOD PRESSURE: 124 MMHG | TEMPERATURE: 98.7 F | OXYGEN SATURATION: 97 % | WEIGHT: 233 LBS | BODY MASS INDEX: 32.62 KG/M2 | RESPIRATION RATE: 16 BRPM

## 2019-05-31 DIAGNOSIS — B35.3 TINEA PEDIS OF BOTH FEET: Primary | ICD-10-CM

## 2019-05-31 PROCEDURE — G8427 DOCREV CUR MEDS BY ELIG CLIN: HCPCS | Performed by: NURSE PRACTITIONER

## 2019-05-31 PROCEDURE — 99213 OFFICE O/P EST LOW 20 MIN: CPT | Performed by: NURSE PRACTITIONER

## 2019-05-31 PROCEDURE — 1036F TOBACCO NON-USER: CPT | Performed by: NURSE PRACTITIONER

## 2019-05-31 PROCEDURE — G8417 CALC BMI ABV UP PARAM F/U: HCPCS | Performed by: NURSE PRACTITIONER

## 2019-05-31 RX ORDER — KETOCONAZOLE 20 MG/G
CREAM TOPICAL
Qty: 60 G | Refills: 1 | Status: SHIPPED | OUTPATIENT
Start: 2019-05-31 | End: 2019-12-18

## 2019-05-31 ASSESSMENT — ENCOUNTER SYMPTOMS
SHORTNESS OF BREATH: 0
DIARRHEA: 0
CONSTIPATION: 0
COUGH: 0
VOMITING: 0
WHEEZING: 0
NAUSEA: 0

## 2019-05-31 ASSESSMENT — PATIENT HEALTH QUESTIONNAIRE - PHQ9
1. LITTLE INTEREST OR PLEASURE IN DOING THINGS: 0
SUM OF ALL RESPONSES TO PHQ QUESTIONS 1-9: 0
2. FEELING DOWN, DEPRESSED OR HOPELESS: 0
SUM OF ALL RESPONSES TO PHQ9 QUESTIONS 1 & 2: 0
SUM OF ALL RESPONSES TO PHQ QUESTIONS 1-9: 0

## 2019-05-31 NOTE — PROGRESS NOTES
HPI:  Patient comes in today for   Chief Complaint   Patient presents with    Nail Problem     pt states his toenails have a fungus also c/o flaky, itchy skin to both feet        Has been having fungus issues on his feet between his toes and on the bottom of his feet. He has tried multiple OTC creams like Lotrimin and others. He has done  apple cider vinegar soaks. He has tried these different therapies for weeks at a time without much difference. Has had issues with his feet for years. Has had 5 toenails removed due to fungus. Prior to Visit Medications    Medication Sig Taking? Authorizing Provider   ketoconazole (NIZORAL) 2 % cream Apply topically daily. Yes ARLEN Clemons CNP   montelukast (SINGULAIR) 10 MG tablet take 1 tablet by mouth once daily Yes Evie Arredondo,    Cholecalciferol (VITAMIN D3) 5000 units TABS Take by mouth daily Yes Historical Provider, MD   busPIRone (BUSPAR) 10 MG tablet take 1 tablet by mouth three times a day if needed for anxiety  Patient taking differently: take 1 tablet by mouth two times a day if needed for anxiety Yes ARLEN Clemons - CNP   buPROPion (WELLBUTRIN XL) 150 MG extended release tablet Take 150 mg by mouth every morning Yes Historical Provider, MD   hydrocortisone (ANUSOL-HC) 2.5 % rectal cream Place rectally 2 times daily. Yes ARLEN Metcalf CNP   Multiple Vitamins-Minerals (THERAPEUTIC MULTIVITAMIN-MINERALS) tablet Take 1 tablet by mouth daily Yes Historical Provider, MD       Allergies   Allergen Reactions    Gabapentin Hives    Percocet [Oxycodone-Acetaminophen] Nausea And Vomiting    Vicodin [Hydrocodone-Acetaminophen] Nausea And Vomiting     Review of Systems  Review of Systems   Constitutional: Negative for appetite change and fatigue. Respiratory: Negative for cough, shortness of breath and wheezing. Cardiovascular: Negative for chest pain and palpitations.    Gastrointestinal: Negative for constipation, diarrhea,

## 2019-07-14 DIAGNOSIS — F41.9 ANXIETY: ICD-10-CM

## 2019-07-15 RX ORDER — BUSPIRONE HYDROCHLORIDE 10 MG/1
TABLET ORAL
Qty: 90 TABLET | Refills: 0 | Status: SHIPPED
Start: 2019-07-15 | End: 2021-07-19

## 2019-12-18 ENCOUNTER — APPOINTMENT (OUTPATIENT)
Dept: GENERAL RADIOLOGY | Age: 34
End: 2019-12-18
Payer: MEDICAID

## 2019-12-18 ENCOUNTER — HOSPITAL ENCOUNTER (EMERGENCY)
Age: 34
Discharge: HOME | End: 2019-12-18
Payer: MEDICAID

## 2019-12-18 VITALS
OXYGEN SATURATION: 98 % | HEART RATE: 92 BPM | DIASTOLIC BLOOD PRESSURE: 86 MMHG | HEIGHT: 70 IN | SYSTOLIC BLOOD PRESSURE: 138 MMHG | BODY MASS INDEX: 34.07 KG/M2 | TEMPERATURE: 98.1 F | WEIGHT: 238 LBS | RESPIRATION RATE: 18 BRPM

## 2019-12-18 DIAGNOSIS — S93.491A SPRAIN OF ANTERIOR TALOFIBULAR LIGAMENT OF RIGHT ANKLE, INITIAL ENCOUNTER: ICD-10-CM

## 2019-12-18 DIAGNOSIS — S83.411A SPRAIN OF MEDIAL COLLATERAL LIGAMENT OF RIGHT KNEE, INITIAL ENCOUNTER: ICD-10-CM
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
## 2019-12-18 PROCEDURE — 73610 X-RAY EXAM OF ANKLE: CPT

## 2019-12-18 PROCEDURE — 6370000000 HC RX 637 (ALT 250 FOR IP): Performed by: PHYSICIAN ASSISTANT

## 2019-12-18 PROCEDURE — 73562 X-RAY EXAM OF KNEE 3: CPT

## 2019-12-18 PROCEDURE — 99283 EMERGENCY DEPT VISIT LOW MDM: CPT

## 2019-12-18 RX ORDER — IBUPROFEN 800 MG/1
800 TABLET ORAL ONCE
Status: COMPLETED | OUTPATIENT
Start: 2019-12-18 | End: 2019-12-18

## 2019-12-18 RX ORDER — IBUPROFEN 600 MG/1
600 TABLET ORAL EVERY 6 HOURS PRN
Qty: 60 TABLET | Refills: 0 | Status: SHIPPED | OUTPATIENT
Start: 2019-12-18 | End: 2021-07-19

## 2019-12-18 RX ADMIN — IBUPROFEN 800 MG: 800 TABLET, FILM COATED ORAL at 15:53

## 2019-12-18 ASSESSMENT — PAIN SCALES - GENERAL
PAINLEVEL_OUTOF10: 6
PAINLEVEL_OUTOF10: 6

## 2019-12-18 ASSESSMENT — PAIN DESCRIPTION - LOCATION: LOCATION: KNEE;ANKLE;LEG

## 2019-12-18 ASSESSMENT — PAIN DESCRIPTION - ORIENTATION: ORIENTATION: RIGHT

## 2019-12-18 ASSESSMENT — PAIN DESCRIPTION - PAIN TYPE: TYPE: ACUTE PAIN

## 2019-12-23 ENCOUNTER — OFFICE VISIT (OUTPATIENT)
Dept: FAMILY MEDICINE CLINIC | Age: 34
End: 2019-12-23
Payer: COMMERCIAL

## 2019-12-23 VITALS
OXYGEN SATURATION: 96 % | HEIGHT: 70 IN | WEIGHT: 237 LBS | BODY MASS INDEX: 33.93 KG/M2 | HEART RATE: 75 BPM | TEMPERATURE: 98 F | DIASTOLIC BLOOD PRESSURE: 78 MMHG | SYSTOLIC BLOOD PRESSURE: 128 MMHG

## 2019-12-23 DIAGNOSIS — M23.91 INTERNAL DERANGEMENT OF RIGHT KNEE: Primary | ICD-10-CM

## 2019-12-23 PROCEDURE — G8427 DOCREV CUR MEDS BY ELIG CLIN: HCPCS | Performed by: FAMILY MEDICINE

## 2019-12-23 PROCEDURE — G8417 CALC BMI ABV UP PARAM F/U: HCPCS | Performed by: FAMILY MEDICINE

## 2019-12-23 PROCEDURE — 99213 OFFICE O/P EST LOW 20 MIN: CPT | Performed by: FAMILY MEDICINE

## 2019-12-23 PROCEDURE — 1036F TOBACCO NON-USER: CPT | Performed by: FAMILY MEDICINE

## 2019-12-23 PROCEDURE — G8484 FLU IMMUNIZE NO ADMIN: HCPCS | Performed by: FAMILY MEDICINE

## 2019-12-23 RX ORDER — BUSPIRONE HYDROCHLORIDE 10 MG/1
TABLET ORAL
COMMUNITY
Start: 2019-07-15 | End: 2019-12-23

## 2019-12-23 RX ORDER — BUPROPION HYDROCHLORIDE 150 MG/1
150 TABLET ORAL
COMMUNITY
End: 2021-07-19

## 2019-12-23 RX ORDER — MONTELUKAST SODIUM 10 MG/1
TABLET ORAL
COMMUNITY
Start: 2019-05-15 | End: 2019-12-23

## 2019-12-23 ASSESSMENT — ENCOUNTER SYMPTOMS
DIARRHEA: 0
SHORTNESS OF BREATH: 0
BLOOD IN STOOL: 0
CONSTIPATION: 0
WHEEZING: 0

## 2019-12-26 ENCOUNTER — HOSPITAL ENCOUNTER (OUTPATIENT)
Dept: MRI IMAGING | Age: 34
Discharge: HOME | End: 2019-12-28
Payer: MEDICAID

## 2019-12-26 DIAGNOSIS — M23.91 INTERNAL DERANGEMENT OF RIGHT KNEE: ICD-10-CM

## 2019-12-26 PROCEDURE — 73721 MRI JNT OF LWR EXTRE W/O DYE: CPT

## 2019-12-27 DIAGNOSIS — M23.91 INTERNAL DERANGEMENT OF RIGHT KNEE: Primary | ICD-10-CM

## 2020-01-02 ENCOUNTER — OFFICE VISIT (OUTPATIENT)
Dept: FAMILY MEDICINE CLINIC | Age: 35
End: 2020-01-02
Payer: COMMERCIAL

## 2020-01-02 VITALS
BODY MASS INDEX: 34.65 KG/M2 | DIASTOLIC BLOOD PRESSURE: 72 MMHG | OXYGEN SATURATION: 96 % | HEIGHT: 70 IN | HEART RATE: 82 BPM | WEIGHT: 242 LBS | TEMPERATURE: 99 F | SYSTOLIC BLOOD PRESSURE: 124 MMHG

## 2020-01-02 PROBLEM — M22.41 CHONDROMALACIA OF PATELLA, RIGHT: Status: ACTIVE | Noted: 2020-01-02

## 2020-01-02 PROBLEM — M23.91 INTERNAL DERANGEMENT OF RIGHT KNEE: Status: ACTIVE | Noted: 2020-01-02

## 2020-01-02 PROCEDURE — G8417 CALC BMI ABV UP PARAM F/U: HCPCS | Performed by: FAMILY MEDICINE

## 2020-01-02 PROCEDURE — G8427 DOCREV CUR MEDS BY ELIG CLIN: HCPCS | Performed by: FAMILY MEDICINE

## 2020-01-02 PROCEDURE — 99213 OFFICE O/P EST LOW 20 MIN: CPT | Performed by: FAMILY MEDICINE

## 2020-01-02 PROCEDURE — G8484 FLU IMMUNIZE NO ADMIN: HCPCS | Performed by: FAMILY MEDICINE

## 2020-01-02 PROCEDURE — 1036F TOBACCO NON-USER: CPT | Performed by: FAMILY MEDICINE

## 2020-01-02 ASSESSMENT — ENCOUNTER SYMPTOMS
DIARRHEA: 0
CONSTIPATION: 0
BLOOD IN STOOL: 0
WHEEZING: 0

## 2020-01-02 ASSESSMENT — PATIENT HEALTH QUESTIONNAIRE - PHQ9
SUM OF ALL RESPONSES TO PHQ QUESTIONS 1-9: 2
SUM OF ALL RESPONSES TO PHQ9 QUESTIONS 1 & 2: 2
2. FEELING DOWN, DEPRESSED OR HOPELESS: 1
SUM OF ALL RESPONSES TO PHQ QUESTIONS 1-9: 2
1. LITTLE INTEREST OR PLEASURE IN DOING THINGS: 1

## 2020-01-02 NOTE — PROGRESS NOTES
HPI:  Patient comes in today for   Chief Complaint   Patient presents with    Results     results of MRI   . Review of Systems  Review of Systems   Constitutional: Negative for chills and diaphoresis. HENT: Negative for ear discharge, ear pain, hearing loss, nosebleeds and tinnitus. Respiratory: Negative for wheezing. Cardiovascular: Negative for chest pain. Gastrointestinal: Negative for blood in stool, constipation and diarrhea. Genitourinary: Negative for dysuria, flank pain and hematuria. Musculoskeletal:        Right knee pain   Skin: Negative for rash. Neurological: Negative for headaches. Hematological: Does not bruise/bleed easily. Psychiatric/Behavioral: Negative for hallucinations and suicidal ideas. PE:  VS:  /72   Pulse 82   Temp 99 °F (37.2 °C) (Oral)   Ht 5' 10\" (1.778 m)   Wt 242 lb (109.8 kg)   SpO2 96%   BMI 34.72 kg/m²   Physical Exam  Constitutional:       Appearance: Normal appearance. He is well-developed. HENT:      Head: Normocephalic and atraumatic. Right Ear: Tympanic membrane normal.      Left Ear: Tympanic membrane normal.      Mouth/Throat:      Mouth: Mucous membranes are moist.   Eyes:      General: No scleral icterus. Conjunctiva/sclera: Conjunctivae normal.      Pupils: Pupils are equal, round, and reactive to light. Neck:      Musculoskeletal: Neck supple. Thyroid: No thyromegaly. Vascular: No JVD. Trachea: No tracheal deviation. Cardiovascular:      Rate and Rhythm: Normal rate and regular rhythm. Heart sounds: Normal heart sounds. No murmur. No gallop. Pulmonary:      Effort: No respiratory distress. Breath sounds: No wheezing. Chest:      Chest wall: No tenderness. Abdominal:      Palpations: There is no mass. Tenderness: There is no tenderness. There is no rebound. Musculoskeletal: Normal range of motion. General: No swelling or tenderness.    Skin:     General: Skin

## 2020-01-03 ENCOUNTER — OFFICE VISIT (OUTPATIENT)
Dept: ORTHOPEDIC SURGERY | Age: 35
End: 2020-01-03
Payer: COMMERCIAL

## 2020-01-03 VITALS — BODY MASS INDEX: 34.65 KG/M2 | HEIGHT: 70 IN | WEIGHT: 242 LBS

## 2020-01-03 PROCEDURE — G8427 DOCREV CUR MEDS BY ELIG CLIN: HCPCS | Performed by: ORTHOPAEDIC SURGERY

## 2020-01-03 PROCEDURE — G8417 CALC BMI ABV UP PARAM F/U: HCPCS | Performed by: ORTHOPAEDIC SURGERY

## 2020-01-03 PROCEDURE — 99204 OFFICE O/P NEW MOD 45 MIN: CPT | Performed by: ORTHOPAEDIC SURGERY

## 2020-01-03 PROCEDURE — 1036F TOBACCO NON-USER: CPT | Performed by: ORTHOPAEDIC SURGERY

## 2020-01-03 PROCEDURE — G8484 FLU IMMUNIZE NO ADMIN: HCPCS | Performed by: ORTHOPAEDIC SURGERY

## 2020-01-03 NOTE — PROGRESS NOTES
Chief Complaint   Patient presents with    New Patient     new patient for right knee pain, patient states he was walking and fell on the ice DOI 12/18/19. He did MRI on 12/26/19. Patient is 29 y.o. male complaining of a noncontact twisting injury right knee and pain for approximately 2 week after twisting and falling on ice on 12/18/2019. Patient reports: Pain and swelling along the anterior portion. Patient still feel a pop but not feeling something dislocated. He admits instability or mechanical symptoms. Previous treatments include rest, ice, compression, elevation, heat.         Past Medical History:   Diagnosis Date    Allergic rhinitis     Anxiety     Chronic back pain     Depression     Hyperlipidemia     Neuropathy      Past Surgical History:   Procedure Laterality Date    COSMETIC SURGERY      LUMBAR DISCECTOMY      LUMBAR DISCECTOMY      SEPTOPLASTY      UPPER GASTROINTESTINAL ENDOSCOPY N/A 3/18/2019    EGD ESOPHAGOGASTRODUODENOSCOPY with [place] [address] [facility]    EGD BIOPSY performed by Halie Couch DO at [address]      varicocelectomy       Current Outpatient Medications:     buPROPion (WELLBUTRIN XL) 150 MG extended release tablet, Take 150 mg by mouth, Disp: , Rfl:     ibuprofen (IBU) 600 MG tablet, Take 1 tablet by mouth every 6 hours as needed for Pain, Disp: 60 tablet, Rfl: 0    busPIRone (BUSPAR) 10 MG tablet, take 1 tablet by mouth three times a day if needed for anxiety, Disp: 90 tablet, Rfl: 0    montelukast (SINGULAIR) 10 MG tablet, take 1 tablet by mouth once daily, Disp: 30 tablet, Rfl: 3    Cholecalciferol (VITAMIN D3) 5000 units TABS, Take by mouth daily, Disp: , Rfl:     Multiple Vitamins-Minerals (THERAPEUTIC MULTIVITAMIN-MINERALS) tablet, Take 1 tablet by mouth daily, Disp: , Rfl:   Allergies   Allergen Reactions    Gabapentin Hives    Percocet [Oxycodone-Acetaminophen] Nausea And Vomiting    Vicodin [Hydrocodone-Acetaminophen] Nausea And Vomiting     Social History     Socioeconomic History    Marital status:      Spouse name: Not on file    Number of children: Not on file    Years of education: Not on file    Highest education level: Not on file   Occupational History    Not on file   Social Needs    Financial resource strain: Not on file    Food insecurity:     Worry: Not on file     Inability: Not on file    Transportation needs:     Medical: Not on file     Non-medical: Not on file   Tobacco Use    Smoking status: Never Smoker    Smokeless tobacco: Never Used   Substance and Sexual Activity    Alcohol use: Yes     Comment: rarely    Drug use: Never    Sexual activity: Not on file   Lifestyle    Physical activity:     Days per week: Not on file     Minutes per session: Not on file    Stress: Not on file   Relationships    Social connections:     Talks on phone: Not on file     Gets together: Not on file     Attends Scientologist service: Not on file     Active member of club or organization: Not on file     Attends meetings of clubs or organizations: Not on file     Relationship status: Not on file    Intimate partner violence:     Fear of current or ex partner: Not on file     Emotionally abused: Not on file     Physically abused: Not on file     Forced sexual activity: Not on file   Other Topics Concern    Not on file   Social History Narrative    Not on file     History reviewed. No pertinent family history. ROS:    Skin: (-) rash,(-) psoriasis,(-) eczema, (-)skin cancer. Musculoskeletal: (-) fractures,  (-) dislocations,(-) collagen vascular disease, (-) fibromyalgia, (-) multiple sclerosis, (-) muscular dystrophy, (-) RSD,(-) joint pain (-)swelling, (-) joint pain,swelling.   Neurologic: (-) epilepsy, (-)seizures,(-) brain tumor,(-) TIA, (-)stroke, (-)headaches, (-)Parkinson disease,(-) memory loss, (-) LOC.  Cardiovascular: (-) Chest pain, (-) swelling in legs/feet, (-) SOB, (-) cramping in legs/feet with walking. Physical Exam:    Ht 5' 10\" (1.778 m)   Wt 242 lb (109.8 kg)   BMI 34.72 kg/m²     GENERAL: alert, appears stated age, cooperative, no distress    HEENT: Head is normocephalic, atraumatic. PERRLA. SKIN: Clean, dry, intact. There is not any cellulitis or cutaneous lesions noted in the lower extremities except noted below in MSK    PULMONARY: breathing is regular and unlabored, no acute distress    CV: The bilateral lower extremities are warm and well-perfused with brisk capillary refill. 2+ pulses LE bilateral.     PSYCHIATRY: Pleasant mood, appropriate behavior, follows commands    NEURO: Sensation is intact distally with light touch with no alteration. Motor exam of the lower extremities show quadriceps, hamstrings, foot dorsiflexion and plantarflexion grossly intact 5/5. LYMPH: No lymphedema present distally in LE.      Ortho Exam       Right Knee  Alignment:  neutral   ROM:  5 degrees extension to 100 degrees flexion      Crepitus:  no   Joint Tenderness:  medial joint line   Effusion:   moderate        Patellar tilt test:  negative   Patellar facet tenderness:  negative medial   negative lateral   Trochlear tenderness:  negative   Moving patellar apprehension test:  negative   Hoffa's test:  negative medial   negative lateral      Hyperextension test:  negative medial   negative lateral   Lachman test:  negative      Anterior drawer test:  negative   Pivot shift test:  negative   Posterior drawer:   negative   Varus laxity at 30 degrees:  negative      Valgus laxity at 30 degrees:   negative      Jb's test:  positive       Quadriceps atrophy:  None     Left Knee  Alignment:  neutral   ROM:  0 degrees extension to 140 degrees flexion      Crepitus:  no   Joint Tenderness:  none    Effusion:   none        Patellar tilt test:  negative   Patellar facet tenderness:  negative medial are normal without evidence of a tear. No joint effusion is seen. Soft tissues are unremarkable. 1.  Low-grade chondromalacia in the anterior compartment. No other abnormalities. Jose A Sims was seen today for new patient. Diagnoses and all orders for this visit:    Patellofemoral disorder of right knee  -     Amb External Referral To Physical Therapy        Patient seen and examined. X-rays reviewed. Patient seen and examined. MRI reviewed with patient in detail. Natural history and course discussed with patient in long discussion  Treatment options discussed with patient in detail including risks and benefits. Patient should do well with conservative management as patient most likely sustained a strain to his MPFL and his pain along with his patellofemoral region. Therapy as well as Flavia pain cream should be of most benefit at this time. Patient is able to return to work with restrictions which were provided today. Otherwise he should refrain from work for 4 to 6 weeks as a . Emil Nugent DO  1/3/20        45 minutes was spent with patient. 50% or greater was spent counseling the patient.

## 2020-03-17 ENCOUNTER — OFFICE VISIT (OUTPATIENT)
Dept: FAMILY MEDICINE CLINIC | Age: 35
End: 2020-03-17
Payer: COMMERCIAL

## 2020-03-17 VITALS
TEMPERATURE: 98 F | HEIGHT: 70 IN | DIASTOLIC BLOOD PRESSURE: 74 MMHG | SYSTOLIC BLOOD PRESSURE: 110 MMHG | BODY MASS INDEX: 33.5 KG/M2 | HEART RATE: 67 BPM | OXYGEN SATURATION: 97 % | WEIGHT: 234 LBS

## 2020-03-17 PROCEDURE — G8484 FLU IMMUNIZE NO ADMIN: HCPCS | Performed by: NURSE PRACTITIONER

## 2020-03-17 PROCEDURE — G8417 CALC BMI ABV UP PARAM F/U: HCPCS | Performed by: NURSE PRACTITIONER

## 2020-03-17 PROCEDURE — G8427 DOCREV CUR MEDS BY ELIG CLIN: HCPCS | Performed by: NURSE PRACTITIONER

## 2020-03-17 PROCEDURE — 99213 OFFICE O/P EST LOW 20 MIN: CPT | Performed by: NURSE PRACTITIONER

## 2020-03-17 PROCEDURE — 1036F TOBACCO NON-USER: CPT | Performed by: NURSE PRACTITIONER

## 2020-03-17 RX ORDER — PREDNISONE 10 MG/1
10 TABLET ORAL SEE ADMIN INSTRUCTIONS
Qty: 21 TABLET | Refills: 0 | Status: SHIPPED | OUTPATIENT
Start: 2020-03-17 | End: 2020-03-23

## 2020-03-17 ASSESSMENT — ENCOUNTER SYMPTOMS
SHORTNESS OF BREATH: 0
NAUSEA: 0
BACK PAIN: 1
CONSTIPATION: 0
DIARRHEA: 0
WHEEZING: 0
VOMITING: 0
COUGH: 0

## 2020-03-17 NOTE — PROGRESS NOTES
diclofenac (VOLTAREN) 50 MG EC tablet; Take 1 tablet by mouth 3 times daily  -     [facility]  - Reviewed side effects of medication and patient verbalizes understanding.   - Don't start Voltaren until steroids are finished  - Advised to call back directly if there are further questions, or if these symptoms fail to improve as anticipated or worsen. History of back surgery  -     predniSONE (DELTASONE) 10 MG tablet; Take 1 tablet by mouth See Admin Instructions for 6 days Taper 6,5,4,3,2,1; take with food  -     diclofenac (VOLTAREN) 50 MG EC tablet;  Take 1 tablet by mouth 3 times daily  -     [facility], [facility]      Greater than 15 minutes was spent with patient and more than 50% of the time was spent face to face counseling and educating regarding diagnoses

## 2020-04-30 ENCOUNTER — TELEPHONE (OUTPATIENT)
Dept: FAMILY MEDICINE CLINIC | Age: 35
End: 2020-04-30

## 2020-06-09 ENCOUNTER — TELEMEDICINE (OUTPATIENT)
Dept: FAMILY MEDICINE CLINIC | Age: 35
End: 2020-06-09
Payer: MEDICAID

## 2020-06-09 PROCEDURE — G8427 DOCREV CUR MEDS BY ELIG CLIN: HCPCS | Performed by: NURSE PRACTITIONER

## 2020-06-09 PROCEDURE — 99213 OFFICE O/P EST LOW 20 MIN: CPT | Performed by: NURSE PRACTITIONER

## 2020-06-09 RX ORDER — AZELASTINE 1 MG/ML
2 SPRAY, METERED NASAL 2 TIMES DAILY
Qty: 4 BOTTLE | Refills: 1 | Status: SHIPPED
Start: 2020-06-09 | End: 2021-07-19

## 2020-06-09 ASSESSMENT — ENCOUNTER SYMPTOMS
SINUS PAIN: 1
EYE PAIN: 0
COUGH: 0
EYE ITCHING: 1
EYE REDNESS: 1
CONSTIPATION: 0
VOMITING: 0
SHORTNESS OF BREATH: 0
SINUS PRESSURE: 1
NAUSEA: 0
DIARRHEA: 1
EYE DISCHARGE: 0
SORE THROAT: 1
WHEEZING: 0

## 2020-08-04 ENCOUNTER — NURSE TRIAGE (OUTPATIENT)
Dept: OTHER | Facility: CLINIC | Age: 35
End: 2020-08-04

## 2020-08-04 ENCOUNTER — OFFICE VISIT (OUTPATIENT)
Dept: FAMILY MEDICINE CLINIC | Age: 35
End: 2020-08-04
Payer: MEDICAID

## 2020-08-04 ENCOUNTER — TELEPHONE (OUTPATIENT)
Dept: ADMINISTRATIVE | Age: 35
End: 2020-08-04

## 2020-08-04 VITALS
BODY MASS INDEX: 35.56 KG/M2 | HEART RATE: 92 BPM | HEIGHT: 71 IN | RESPIRATION RATE: 14 BRPM | WEIGHT: 254 LBS | DIASTOLIC BLOOD PRESSURE: 92 MMHG | OXYGEN SATURATION: 94 % | TEMPERATURE: 97.2 F | SYSTOLIC BLOOD PRESSURE: 124 MMHG

## 2020-08-04 PROCEDURE — 1036F TOBACCO NON-USER: CPT | Performed by: NURSE PRACTITIONER

## 2020-08-04 PROCEDURE — 99213 OFFICE O/P EST LOW 20 MIN: CPT | Performed by: NURSE PRACTITIONER

## 2020-08-04 PROCEDURE — G8427 DOCREV CUR MEDS BY ELIG CLIN: HCPCS | Performed by: NURSE PRACTITIONER

## 2020-08-04 PROCEDURE — G8417 CALC BMI ABV UP PARAM F/U: HCPCS | Performed by: NURSE PRACTITIONER

## 2020-08-04 RX ORDER — MIRTAZAPINE 15 MG/1
15 TABLET, FILM COATED ORAL NIGHTLY
COMMUNITY
End: 2021-07-19

## 2020-08-04 ASSESSMENT — ENCOUNTER SYMPTOMS
SHORTNESS OF BREATH: 0
CONSTIPATION: 0
DIARRHEA: 0
NAUSEA: 0
VOMITING: 0
WHEEZING: 0
COUGH: 0

## 2020-08-04 NOTE — PROGRESS NOTES
HPI:  Patient comes intoday for   Chief Complaint   Patient presents with    Wrist Pain     right wrist. x1 month. exploding firey pain. constant in last 7-10 days. travels up into neck. unable to open up hand fully or make a fist (painful to try) . nothing has relieved the pain. no injury. wakes up to numb hand. .    Complains of right wrist pain for past month. States pain is much worse over past 7 to 10 days. Pain is constant but does have sharp stabbing pain. Unable to completely open or close fist.  Right hand dominant. Pain radiates up arm to right side of neck. Denies injury. No repetitive use. Hand is numb when he wakes up. Prior to Visit Medications    Medication Sig Taking?  Authorizing Provider   mirtazapine (REMERON) 15 MG tablet Take 15 mg by mouth nightly Yes Historical Provider, MD   azelastine (ASTELIN) 0.1 % nasal spray 2 sprays by Nasal route 2 times daily Use in each nostril as directed Yes Ulysses Kempf, APRN - CNP   buPROPion (WELLBUTRIN XL) 150 MG extended release tablet Take 150 mg by mouth Yes Historical Provider, MD   ibuprofen (IBU) 600 MG tablet Take 1 tablet by mouth every 6 hours as needed for Pain Yes Chinyere Pringle PA-C   busPIRone (BUSPAR) 10 MG tablet take 1 tablet by mouth three times a day if needed for anxiety Yes Ulysses Kempf, APRN - CNP   montelukast (SINGULAIR) 10 MG tablet take 1 tablet by mouth once daily Yes Jamie Madsen DO   Cholecalciferol (VITAMIN D3) 5000 units TABS Take by mouth daily Yes Historical Provider, MD   Multiple Vitamins-Minerals (THERAPEUTIC MULTIVITAMIN-MINERALS) tablet Take 1 tablet by mouth daily Yes Historical Provider, MD         Allergies   Allergen Reactions    Gabapentin Hives    Percocet [Oxycodone-Acetaminophen] Nausea And Vomiting    Vicodin [Hydrocodone-Acetaminophen] Nausea And Vomiting         Review of Systems  Review of Systems   Constitutional: Negative for activity change, appetite change and unexpected weight change. Respiratory: Negative for cough, shortness of breath and wheezing. Cardiovascular: Negative for chest pain and palpitations. Gastrointestinal: Negative for constipation, diarrhea, nausea and vomiting. Musculoskeletal: Positive for arthralgias, myalgias, neck pain and neck stiffness. Neurological: Positive for numbness and headaches (occasional-back of head). Negative for weakness and light-headedness. VS:  BP (!) 124/92   Pulse 92   Temp 97.2 °F (36.2 °C) (Temporal)   Resp 14   Ht 5' 11\" (1.803 m)   Wt 254 lb (115.2 kg)   SpO2 94%   BMI 35.43 kg/m²     Physical Exam  Physical Exam  Constitutional:       General: He is not in acute distress. Appearance: He is well-developed. HENT:      Head: Normocephalic and atraumatic. Neck:      Thyroid: No thyromegaly. Trachea: No tracheal deviation. Cardiovascular:      Rate and Rhythm: Normal rate and regular rhythm. Pulses: Normal pulses. Heart sounds: No murmur. Pulmonary:      Effort: Pulmonary effort is normal.      Breath sounds: Normal breath sounds. No wheezing or rales. Chest:      Chest wall: No tenderness. Abdominal:      General: Bowel sounds are normal.      Palpations: Abdomen is soft. Tenderness: There is no abdominal tenderness. Musculoskeletal:         General: Tenderness present. Comments: Pain to cervical spine with minimal tenderness to right paraspinal musculature. Right shoulder minimally tender posteriorly, pain to posterior upper arm down to ulnar grove. Right wrist pain-dorsal mid wrist pain extending up lateral forearm. Negative Phalen's, Tinel's and Finklestein's   Lymphadenopathy:      Cervical: No cervical adenopathy. Skin:     General: Skin is warm and dry. Neurological:      Mental Status: He is alert and oriented to person, place, and time.    Psychiatric:         Behavior: Behavior normal.           Assessment/Plan:  Sylvain Samuel was seen today for wrist

## 2020-08-04 NOTE — TELEPHONE ENCOUNTER
Pt calling in stating that he is in severe pain in his right wrist that is emanating up his arm into shoulder and neck all on right side.  Following protocol transferring to nurse triage, ok to nurse Bosie Mosca.

## 2020-08-14 ENCOUNTER — OFFICE VISIT (OUTPATIENT)
Dept: PHYSICAL MEDICINE AND REHAB | Age: 35
End: 2020-08-14
Payer: MEDICAID

## 2020-08-14 VITALS — WEIGHT: 250 LBS | BODY MASS INDEX: 35 KG/M2 | HEIGHT: 71 IN

## 2020-08-14 PROCEDURE — 95912 NRV CNDJ TEST 11-12 STUDIES: CPT | Performed by: PHYSICAL MEDICINE & REHABILITATION

## 2020-08-14 PROCEDURE — G8417 CALC BMI ABV UP PARAM F/U: HCPCS | Performed by: PHYSICAL MEDICINE & REHABILITATION

## 2020-08-14 PROCEDURE — 99202 OFFICE O/P NEW SF 15 MIN: CPT | Performed by: PHYSICAL MEDICINE & REHABILITATION

## 2020-08-14 PROCEDURE — G8428 CUR MEDS NOT DOCUMENT: HCPCS | Performed by: PHYSICAL MEDICINE & REHABILITATION

## 2020-08-14 PROCEDURE — 95886 MUSC TEST DONE W/N TEST COMP: CPT | Performed by: PHYSICAL MEDICINE & REHABILITATION

## 2020-08-14 NOTE — PROGRESS NOTES
[address] [facility]  Electrodiagnostic Laboratory  *Accredited by the [address] [place] [address] with exemplary status  [address] [facility] [place]  Phone: [phone]  Fax: [phone]    Referring Provider: ARLEN Chiang -*  Primary Care Physician: Caroline Montiel DO  Patient Name: Nikos Rangel  Patient YOB: 1985  Gender: male  BMI: Body mass index is 34.87 kg/m². Height 5' 11\" (1.803 m), weight 250 lb (113.4 kg). 8/14/2020    Description of clinical problem:   Chief Complaint   Patient presents with    Wrist Pain     RIght posterior wrist pain present for 2-3 months without acute injury.  Hand Numbness     Right hand generalized numbness and tingling     Extremity Weakness     Patient complains of decreased  strength     Pain Yes  Pain Score:   5; Numbness/tingling  Yes; Weakness  Yes       Sensory NCS      Nerve / Sites Rec. Site Peak Lat PP Amp Segments Distance Velocity Temp. ms µV  cm m/s °C   R Median - Digit II (Antidromic)      Palm Dig II 1.72 43.5 Palm - Dig II 7 56 33.1      Wrist Dig II 3.02 33.0 Wrist - Dig II 14 60 33.1   R Ulnar - Digit V (Antidromic)      Wrist Dig V 3.28 25.2 Wrist - Dig V 14 54 32.4   R Radial - Anatomical snuff box (Forearm)      Forearm Wrist 2.08 21.4 Forearm - Wrist 10 62 32.8       Combined Sensory Index      Nerve / Sites Rec. Site Peak Lat NP Amp PP Amp Segments Dist. Peak Diff Temp.      ms µV µV  cm ms °C   R Median - CSI      Median Thumb 2.40 17.7 21.9 Median - Radial 10 0.05 32.9      Radial Thumb 2.34 6.6 6.8 Median - Ulnar 14 0.16 32.9      Median Ring 3.02 4.2 5.3 Median palm - Ulnar palm 8 0.00 32.9      Ulnar Ring 2.86 2.3 10.1          Median palm Wrist 1.88 16.1 29.4          Ulnar palm Wrist 1.88 9.1 9.5          CSI     CSI  0.21        Motor NCS      Nerve / Sites Muscle Onset Amplitude Segments Distance Velocity Temp.     ms mV  cm m/s °C   R Median - APB      Palm APB 1.51 13.9 Palm - APB   32.9 Wrist APB 2.92 13.2 Wrist - Palm 8 57 32.9      Elbow APB 6.72 12.7 Elbow - Wrist 22 58 32.9   R Ulnar - ADM      Wrist ADM 2.92 13.7 Wrist - ADM 8  33.2      B. Elbow ADM 6.51 13.3 B. Elbow - Wrist 20 56 33.2      A. Elbow ADM 8.23 13.0 A. Elbow - B. Elbow 10 58 33.2       F  Wave      Nerve Fmin % F    ms %   R Median - APB 26.93 0   R Ulnar - ADM 26.93 20       EMG      EMG Summary Table     Spontaneous MUAP Recruitment   Muscle Nerve Roots IA Fib PSW Fasc Amp Dur. PPP Pattern   R. Biceps brachii Musculocutaneous C5-C6 N None None None N N N N   R. Triceps brachii Radial C6-C8 N None None None N N N N   R. Pronator teres Median C6-C7 N None None None N N N N   R. First dorsal interosseous Ulnar C8-T1 N None None None N N N N   R. Abductor pollicis brevis Median H3-W2 N None None None N N N N          Study Limitations:  none    Summary of Findings:   Nerve conduction studies:   · All nerve conduction studies listed in the table above were normal in latency, amplitude and conduction velocity. Needle EMG:   · Needle EMG was performed using a concentric needle. All muscles tested, as listed in the table above demonstrated normal amplitude, duration, phases and recruitment and no active denervation signs were seen. Diagnostic Interpretation: This study was normal.     Previous Study: None      Follow up EMG is recommended if clinically warranted only. Technologist: Elsie Fink LPN, CNCT   Physician:    Marcy Cooley D.O., P.T. Board Certified Physical Medicine and Rehabilitation  Board Certified Electrodiagnostic Medicine      Nerve conduction studies and electromyography were performed according to our laboratory policies and procedures which can be provided upon request. All abnormal values are identified in the table.  Laboratory normal values can also be provided upon request.       Cc: ARLEN Kamara -*  Sanchez Olivares DO

## 2020-08-14 NOTE — PATIENT INSTRUCTIONS
Electrodiagnotic Laboratory  Accredited by the [facility] with Exemplary status  JYOTHI Vasquez D.O. [facility]  [address] [place]  Phone: [phone]  Fax: [phone]        Today you had an electrodiagnostic exam which included nerve conduction studies (NCS) and electromyography (EMG). This test evaluated the electrical activity of your nerves and muscles to help determine if you have a nerve or muscle disease. This test can help determine the location and type of a nerve or muscle problem. This will help your referring doctor diagnose your condition and determine the appropriate next step in your treatment plan. After your test:    1. There are no long lasting side effects of the test.     2. You may resume your normal activities without restrictions. 3.  Resume any medications that were stopped for the test.     4  If you have sore areas or bruising in your muscles where the needle was placed, apply a cold pack to the sore area for 15-20 minutes three to four times a day as needed for pain. The soreness should go away in about 1-2 days. 5. Your results were provided  Briefly at the end of your test and the final detailed report will be provided to your referring physician, and/or primary care physician and any other parties you requested within 1-2 days of the examination. You may wish to contact your referring provider after a few days to determine what they would like you to do next. 6.  Please call [phone] with any questions or concerns and if you develop increased body temperature/fever, swelling, tenderness, increased pain and/or drainage from the sites where the needle was placed. Thank you for choosing us for your health care needs.

## 2021-03-26 ENCOUNTER — IMMUNIZATION (OUTPATIENT)
Dept: PRIMARY CARE CLINIC | Age: 36
End: 2021-03-26
Payer: MEDICAID

## 2021-03-26 PROCEDURE — 0001A COVID-19, PFIZER VACCINE 30MCG/0.3ML DOSE: CPT | Performed by: FAMILY MEDICINE

## 2021-03-26 PROCEDURE — 91300 COVID-19, PFIZER VACCINE 30MCG/0.3ML DOSE: CPT | Performed by: FAMILY MEDICINE

## 2021-04-22 ENCOUNTER — IMMUNIZATION (OUTPATIENT)
Dept: PRIMARY CARE CLINIC | Age: 36
End: 2021-04-22
Payer: MEDICAID

## 2021-04-22 PROCEDURE — 0002A COVID-19, PFIZER VACCINE 30MCG/0.3ML DOSE: CPT | Performed by: INTERNAL MEDICINE

## 2021-04-22 PROCEDURE — 91300 COVID-19, PFIZER VACCINE 30MCG/0.3ML DOSE: CPT | Performed by: INTERNAL MEDICINE

## 2021-07-19 ENCOUNTER — APPOINTMENT (OUTPATIENT)
Dept: CT IMAGING | Age: 36
End: 2021-07-19
Payer: MEDICAID

## 2021-07-19 ENCOUNTER — HOSPITAL ENCOUNTER (EMERGENCY)
Age: 36
Discharge: HOME | End: 2021-07-19
Attending: EMERGENCY MEDICINE
Payer: MEDICAID

## 2021-07-19 VITALS
TEMPERATURE: 97.7 F | RESPIRATION RATE: 20 BRPM | BODY MASS INDEX: 29.82 KG/M2 | OXYGEN SATURATION: 100 % | HEART RATE: 72 BPM | HEIGHT: 71 IN | WEIGHT: 213 LBS | DIASTOLIC BLOOD PRESSURE: 75 MMHG | SYSTOLIC BLOOD PRESSURE: 120 MMHG

## 2021-07-19 DIAGNOSIS — R11.2 NON-INTRACTABLE VOMITING WITH NAUSEA, UNSPECIFIED VOMITING TYPE: ICD-10-CM

## 2021-07-19 DIAGNOSIS — R19.7 DIARRHEA, UNSPECIFIED TYPE: ICD-10-CM

## 2021-07-19 DIAGNOSIS — R10.84 GENERALIZED ABDOMINAL PAIN: Primary | ICD-10-CM

## 2021-07-19 LAB
ALBUMIN SERPL-MCNC: 4.8 G/DL (ref 3.5–5.2)
ALP BLD-CCNC: 74 U/L (ref 40–129)
ALT SERPL-CCNC: 17 U/L (ref 0–40)
ANION GAP SERPL CALCULATED.3IONS-SCNC: 9 MMOL/L (ref 7–16)
AST SERPL-CCNC: 14 U/L (ref 0–39)
BASOPHILS ABSOLUTE: 0.01 E9/L (ref 0–0.2)
BASOPHILS RELATIVE PERCENT: 0.1 % (ref 0–2)
BILIRUB SERPL-MCNC: 0.5 MG/DL (ref 0–1.2)
BILIRUBIN URINE: NEGATIVE
BLOOD, URINE: NEGATIVE
BUN BLDV-MCNC: 12 MG/DL (ref 6–20)
CALCIUM SERPL-MCNC: 10.2 MG/DL (ref 8.6–10.2)
CHLORIDE BLD-SCNC: 101 MMOL/L (ref 98–107)
CLARITY: CLEAR
CO2: 28 MMOL/L (ref 22–29)
COLOR: YELLOW
CREAT SERPL-MCNC: 1.1 MG/DL (ref 0.7–1.2)
EOSINOPHILS ABSOLUTE: 0.14 E9/L (ref 0.05–0.5)
EOSINOPHILS RELATIVE PERCENT: 1.3 % (ref 0–6)
GFR AFRICAN AMERICAN: >60
GFR NON-AFRICAN AMERICAN: >60 ML/MIN/1.73
GLUCOSE BLD-MCNC: 130 MG/DL (ref 74–99)
GLUCOSE URINE: NEGATIVE MG/DL
HCT VFR BLD CALC: 46.6 % (ref 37–54)
HEMOGLOBIN: 16.8 G/DL (ref 12.5–16.5)
IMMATURE GRANULOCYTES #: 0.03 E9/L
IMMATURE GRANULOCYTES %: 0.3 % (ref 0–5)
KETONES, URINE: NEGATIVE MG/DL
LACTIC ACID: 1.9 MMOL/L (ref 0.5–2.2)
LEUKOCYTE ESTERASE, URINE: NEGATIVE
LIPASE: 29 U/L (ref 13–60)
LYMPHOCYTES ABSOLUTE: 0.8 E9/L (ref 1.5–4)
LYMPHOCYTES RELATIVE PERCENT: 7.4 % (ref 20–42)
MCH RBC QN AUTO: 29.8 PG (ref 26–35)
MCHC RBC AUTO-ENTMCNC: 36.1 % (ref 32–34.5)
MCV RBC AUTO: 82.6 FL (ref 80–99.9)
MONOCYTES ABSOLUTE: 0.5 E9/L (ref 0.1–0.95)
MONOCYTES RELATIVE PERCENT: 4.6 % (ref 2–12)
NEUTROPHILS ABSOLUTE: 9.4 E9/L (ref 1.8–7.3)
NEUTROPHILS RELATIVE PERCENT: 86.3 % (ref 43–80)
NITRITE, URINE: NEGATIVE
PDW BLD-RTO: 11.7 FL (ref 11.5–15)
PH UA: 6.5 (ref 5–9)
PLATELET # BLD: 303 E9/L (ref 130–450)
PMV BLD AUTO: 10.3 FL (ref 7–12)
POTASSIUM SERPL-SCNC: 4.6 MMOL/L (ref 3.5–5)
PROTEIN UA: NEGATIVE MG/DL
RBC # BLD: 5.64 E12/L (ref 3.8–5.8)
SODIUM BLD-SCNC: 138 MMOL/L (ref 132–146)
SPECIFIC GRAVITY UA: <=1.005 (ref 1–1.03)
TOTAL PROTEIN: 7.3 G/DL (ref 6.4–8.3)
UROBILINOGEN, URINE: 0.2 E.U./DL
WBC # BLD: 10.9 E9/L (ref 4.5–11.5)

## 2021-07-19 PROCEDURE — 81003 URINALYSIS AUTO W/O SCOPE: CPT

## 2021-07-19 PROCEDURE — 6360000004 HC RX CONTRAST MEDICATION: Performed by: RADIOLOGY

## 2021-07-19 PROCEDURE — 99283 EMERGENCY DEPT VISIT LOW MDM: CPT

## 2021-07-19 PROCEDURE — 83690 ASSAY OF LIPASE: CPT

## 2021-07-19 PROCEDURE — 96375 TX/PRO/DX INJ NEW DRUG ADDON: CPT

## 2021-07-19 PROCEDURE — 85025 COMPLETE CBC W/AUTO DIFF WBC: CPT

## 2021-07-19 PROCEDURE — 6360000002 HC RX W HCPCS: Performed by: EMERGENCY MEDICINE

## 2021-07-19 PROCEDURE — 96372 THER/PROPH/DIAG INJ SC/IM: CPT

## 2021-07-19 PROCEDURE — 96374 THER/PROPH/DIAG INJ IV PUSH: CPT

## 2021-07-19 PROCEDURE — 74177 CT ABD & PELVIS W/CONTRAST: CPT

## 2021-07-19 PROCEDURE — 80053 COMPREHEN METABOLIC PANEL: CPT

## 2021-07-19 PROCEDURE — 2580000003 HC RX 258: Performed by: EMERGENCY MEDICINE

## 2021-07-19 PROCEDURE — 83605 ASSAY OF LACTIC ACID: CPT

## 2021-07-19 RX ORDER — ONDANSETRON 2 MG/ML
4 INJECTION INTRAMUSCULAR; INTRAVENOUS ONCE
Status: COMPLETED | OUTPATIENT
Start: 2021-07-19 | End: 2021-07-19

## 2021-07-19 RX ORDER — DICYCLOMINE HYDROCHLORIDE 10 MG/ML
20 INJECTION INTRAMUSCULAR ONCE
Status: COMPLETED | OUTPATIENT
Start: 2021-07-19 | End: 2021-07-19

## 2021-07-19 RX ORDER — 0.9 % SODIUM CHLORIDE 0.9 %
1000 INTRAVENOUS SOLUTION INTRAVENOUS ONCE
Status: COMPLETED | OUTPATIENT
Start: 2021-07-19 | End: 2021-07-19

## 2021-07-19 RX ORDER — DICYCLOMINE HYDROCHLORIDE 10 MG/1
10 CAPSULE ORAL
Qty: 20 CAPSULE | Refills: 0 | Status: SHIPPED | OUTPATIENT
Start: 2021-07-19

## 2021-07-19 RX ORDER — ONDANSETRON 4 MG/1
4 TABLET, ORALLY DISINTEGRATING ORAL EVERY 8 HOURS PRN
Qty: 10 TABLET | Refills: 0 | Status: SHIPPED | OUTPATIENT
Start: 2021-07-19 | End: 2022-07-19

## 2021-07-19 RX ORDER — KETOROLAC TROMETHAMINE 30 MG/ML
30 INJECTION, SOLUTION INTRAMUSCULAR; INTRAVENOUS ONCE
Status: COMPLETED | OUTPATIENT
Start: 2021-07-19 | End: 2021-07-19

## 2021-07-19 RX ADMIN — IOPAMIDOL 80 ML: 755 INJECTION, SOLUTION INTRAVENOUS at 04:43

## 2021-07-19 RX ADMIN — SODIUM CHLORIDE 1000 ML: 9 INJECTION, SOLUTION INTRAVENOUS at 03:28

## 2021-07-19 RX ADMIN — ONDANSETRON 4 MG: 2 INJECTION INTRAMUSCULAR; INTRAVENOUS at 03:27

## 2021-07-19 RX ADMIN — KETOROLAC TROMETHAMINE 30 MG: 30 INJECTION, SOLUTION INTRAMUSCULAR; INTRAVENOUS at 03:28

## 2021-07-19 RX ADMIN — DICYCLOMINE HYDROCHLORIDE 20 MG: 10 INJECTION INTRAMUSCULAR at 03:27

## 2021-07-19 ASSESSMENT — ENCOUNTER SYMPTOMS
BACK PAIN: 0
DIARRHEA: 1
VOMITING: 1
EYE PAIN: 0
EYE REDNESS: 0
SHORTNESS OF BREATH: 0
NAUSEA: 1
SORE THROAT: 0
EYE DISCHARGE: 0
ABDOMINAL PAIN: 1
BLOOD IN STOOL: 0
SINUS PRESSURE: 0
WHEEZING: 0
COUGH: 0

## 2021-07-19 ASSESSMENT — PAIN - FUNCTIONAL ASSESSMENT: PAIN_FUNCTIONAL_ASSESSMENT: PREVENTS OR INTERFERES WITH ALL ACTIVE AND SOME PASSIVE ACTIVITIES

## 2021-07-19 ASSESSMENT — PAIN SCALES - GENERAL
PAINLEVEL_OUTOF10: 9
PAINLEVEL_OUTOF10: 10

## 2021-07-19 ASSESSMENT — PAIN DESCRIPTION - LOCATION: LOCATION: ABDOMEN

## 2021-07-19 ASSESSMENT — PAIN DESCRIPTION - FREQUENCY: FREQUENCY: CONTINUOUS

## 2021-07-19 ASSESSMENT — PAIN DESCRIPTION - PAIN TYPE: TYPE: ACUTE PAIN

## 2021-07-19 ASSESSMENT — PAIN DESCRIPTION - PROGRESSION: CLINICAL_PROGRESSION: GRADUALLY WORSENING

## 2021-07-19 ASSESSMENT — PAIN DESCRIPTION - ONSET: ONSET: GRADUAL

## 2021-07-19 ASSESSMENT — PAIN DESCRIPTION - DESCRIPTORS: DESCRIPTORS: CRAMPING

## 2021-07-19 NOTE — ED PROVIDER NOTES
Patient is a 40 y/o male who presents to the ED with abdominal cramping, nausea, vomiting and diarrhea. Patient states he had onset of diffuse abdominal cramping 2 days ago. He has had nausea, vomiting and diarrhea. He denies any fever. He denies any blood in the vomit or stool. Currently, he reports 9/10 abdominal pain. Review of Systems   Constitutional: Negative for chills and fever. HENT: Negative for ear pain, sinus pressure and sore throat. Eyes: Negative for pain, discharge and redness. Respiratory: Negative for cough, shortness of breath and wheezing. Cardiovascular: Negative for chest pain. Gastrointestinal: Positive for abdominal pain, diarrhea, nausea and vomiting. Negative for blood in stool. Genitourinary: Negative for dysuria and frequency. Musculoskeletal: Negative for arthralgias and back pain. Skin: Negative for rash and wound. Neurological: Negative for weakness and headaches. Hematological: Negative for adenopathy. All other systems reviewed and are negative. Physical Exam  Vitals and nursing note reviewed. Constitutional:       General: He is not in acute distress. HENT:      Head: Normocephalic and atraumatic. Mouth/Throat:      Mouth: Mucous membranes are moist.   Eyes:      Pupils: Pupils are equal, round, and reactive to light. Cardiovascular:      Rate and Rhythm: Normal rate and regular rhythm. Heart sounds: No murmur heard. Abdominal:      General: Bowel sounds are normal. There is no distension. Palpations: Abdomen is soft. Tenderness: There is generalized abdominal tenderness. There is no guarding. Negative signs include Kumar's sign and McBurney's sign. Skin:     General: Skin is warm and dry. Findings: No rash. Neurological:      Mental Status: He is alert and oriented to person, place, and time.           Procedures     [facility]             --------------------------------------------- PAST HISTORY ---------------------------------------------  Past Medical History:  has a past medical history of Allergic rhinitis, Anxiety, Chronic back pain, Cryptosporidial gastroenteritis ([address] Depression, Hyperlipidemia, and Neuropathy. Past Surgical History:  has a past surgical history that includes lumbar discectomy; Septoplasty; Cosmetic surgery; lumbar discectomy; vascular surgery; Upper gastrointestinal endoscopy (N/A, 3/18/2019); and Upper gastrointestinal endoscopy (3/18/2019). Social History:  reports that he has never smoked. He has never used smokeless tobacco. He reports current alcohol use. He reports current drug use. Frequency: 14.00 times per week. Drug: Marijuana. Family History: family history is not on file. The patients home medications have been reviewed.     Allergies: Gabapentin, Percocet [oxycodone-acetaminophen], and Vicodin [hydrocodone-acetaminophen]    -------------------------------------------------- RESULTS -------------------------------------------------  Labs:  Results for orders placed or performed during the hospital encounter of 07/19/21   Urinalysis   Result Value Ref Range    Color, UA Yellow Straw/Yellow    Clarity, UA Clear Clear    Glucose, Ur Negative Negative mg/dL    Bilirubin Urine Negative Negative    Ketones, Urine Negative Negative mg/dL    Specific Gravity, UA <=1.005 1.005 - 1.030    Blood, Urine Negative Negative    pH, UA 6.5 5.0 - 9.0    Protein, UA Negative Negative mg/dL    Urobilinogen, Urine 0.2 <2.0 E.U./dL    Nitrite, Urine Negative Negative    Leukocyte Esterase, Urine Negative Negative   CBC auto differential   Result Value Ref Range    WBC 10.9 4.5 - 11.5 E9/L    RBC 5.64 3.80 - 5.80 E12/L    Hemoglobin 16.8 (H) 12.5 - 16.5 g/dL    Hematocrit 46.6 37.0 - 54.0 %    MCV 82.6 80.0 - 99.9 fL    MCH 29.8 26.0 - 35.0 pg    MCHC 36.1 (H) 32.0 - 34.5 %    RDW 11.7 11.5 - 15.0 fL    Platelets 318 107 - 915 E9/L    MPV 10.3 7.0 - 12.0 fL    Neutrophils % 86. 3 (H) 43.0 - 80.0 %    Immature Granulocytes % 0.3 0.0 - 5.0 %    Lymphocytes % 7.4 (L) 20.0 - 42.0 %    Monocytes % 4.6 2.0 - 12.0 %    Eosinophils % 1.3 0.0 - 6.0 %    Basophils % 0.1 0.0 - 2.0 %    Neutrophils Absolute 9.40 (H) 1.80 - 7.30 E9/L    Immature Granulocytes # 0.03 E9/L    Lymphocytes Absolute 0.80 (L) 1.50 - 4.00 E9/L    Monocytes Absolute 0.50 0.10 - 0.95 E9/L    Eosinophils Absolute 0.14 0.05 - 0.50 E9/L    Basophils Absolute 0.01 0.00 - 0.20 E9/L   Comprehensive Metabolic Panel   Result Value Ref Range    Sodium 138 132 - 146 mmol/L    Potassium 4.6 3.5 - 5.0 mmol/L    Chloride 101 98 - 107 mmol/L    CO2 28 22 - 29 mmol/L    Anion Gap 9 7 - 16 mmol/L    Glucose 130 (H) 74 - 99 mg/dL    BUN 12 6 - 20 mg/dL    CREATININE 1.1 0.7 - 1.2 mg/dL    GFR Non-African American >60 >=60 mL/min/1.73    GFR African American >60     Calcium 10.2 8.6 - 10.2 mg/dL    Total Protein 7.3 6.4 - 8.3 g/dL    Albumin 4.8 3.5 - 5.2 g/dL    Total Bilirubin 0.5 0.0 - 1.2 mg/dL    Alkaline Phosphatase 74 40 - 129 U/L    ALT 17 0 - 40 U/L    AST 14 0 - 39 U/L   Lipase   Result Value Ref Range    Lipase 29 13 - 60 U/L   Lactic Acid, Plasma   Result Value Ref Range    Lactic Acid 1.9 0.5 - 2.2 mmol/L       Radiology:  CT ABDOMEN PELVIS W IV CONTRAST Additional Contrast? None   Final Result   No active process demonstrated. Lumbar spine fusion with decompressive   laminectomy. ------------------------- NURSING NOTES AND VITALS REVIEWED ---------------------------  Date / Time Roomed:  7/19/2021  2:38 AM  ED Bed Assignment:  08/08    The nursing notes within the ED encounter and vital signs as below have been reviewed.    /75   Pulse 72   Temp 97.7 °F (36.5 °C) (Oral)   Resp 20   Ht 5' 11\" (1.803 m)   Wt 213 lb (96.6 kg)   SpO2 100%   BMI 29.71 kg/m²   Oxygen Saturation Interpretation: Normal      ------------------------------------------ PROGRESS NOTES ------------------------------------------  I have spoken with the patient and discussed todays results, in addition to providing specific details for the plan of care and counseling regarding the diagnosis and prognosis. Their questions are answered at this time and they are agreeable with the plan. I discussed at length with them reasons for immediate return here for re evaluation. They will followup with primary care by calling their office tomorrow. --------------------------------- ADDITIONAL PROVIDER NOTES ---------------------------------  At this time the patient is without objective evidence of an acute process requiring hospitalization or inpatient management. They have remained hemodynamically stable throughout their entire ED visit and are stable for discharge with outpatient follow-up. The plan has been discussed in detail and they are aware of the specific conditions for emergent return, as well as the importance of follow-up. New Prescriptions    DICYCLOMINE (BENTYL) 10 MG CAPSULE    Take 1 capsule by mouth 4 times daily (before meals and nightly)    ONDANSETRON (ZOFRAN ODT) 4 MG DISINTEGRATING TABLET    Take 1 tablet by mouth every 8 hours as needed for Nausea or Vomiting       Diagnosis:  1. Generalized abdominal pain    2. Non-intractable vomiting with nausea, unspecified vomiting type    3. Diarrhea, unspecified type        Disposition:  Patient's disposition: Discharge to home  Patient's condition is stable.          Sherie Montelongo DO  07/19/21 [id number]

## 2022-08-11 ENCOUNTER — HOSPITAL ENCOUNTER (EMERGENCY)
Age: 37
Discharge: HOME | End: 2022-08-11
Payer: OTHER GOVERNMENT

## 2022-08-11 VITALS
TEMPERATURE: 98 F | RESPIRATION RATE: 16 BRPM | DIASTOLIC BLOOD PRESSURE: 77 MMHG | OXYGEN SATURATION: 99 % | SYSTOLIC BLOOD PRESSURE: 125 MMHG | HEIGHT: 70 IN | HEART RATE: 77 BPM | WEIGHT: 207 LBS | BODY MASS INDEX: 29.63 KG/M2

## 2022-08-11 DIAGNOSIS — T63.444A BEE STING, UNDETERMINED INTENT, INITIAL ENCOUNTER: Primary | ICD-10-CM

## 2022-08-11 PROCEDURE — 99283 EMERGENCY DEPT VISIT LOW MDM: CPT

## 2022-08-11 RX ORDER — DIPHENHYDRAMINE HCL 25 MG
25 TABLET ORAL EVERY 6 HOURS PRN
Qty: 30 TABLET | Refills: 0 | Status: SHIPPED | OUTPATIENT
Start: 2022-08-11

## 2022-08-11 RX ORDER — METHYLPREDNISOLONE 4 MG/1
TABLET ORAL
Qty: 1 KIT | Refills: 0 | Status: SHIPPED | OUTPATIENT
Start: 2022-08-11 | End: 2022-08-17

## 2022-08-11 ASSESSMENT — PAIN SCALES - GENERAL: PAINLEVEL_OUTOF10: 7

## 2022-08-11 ASSESSMENT — PAIN DESCRIPTION - LOCATION: LOCATION: FACE

## 2022-08-11 ASSESSMENT — PAIN - FUNCTIONAL ASSESSMENT: PAIN_FUNCTIONAL_ASSESSMENT: 0-10

## 2022-08-11 ASSESSMENT — PAIN DESCRIPTION - ORIENTATION: ORIENTATION: LEFT

## 2022-08-11 NOTE — ED PROVIDER NOTES
------------------------- NURSING NOTES AND VITALS REVIEWED ---------------------------   The nursing notes within the ED encounter and vital signs as below have been reviewed. /77   Pulse 77   Temp 98 °F (36.7 °C)   Resp 16   Ht 5' 10\" (1.778 m)   Wt 207 lb (93.9 kg)   SpO2 99%   BMI 29.70 kg/m²   Oxygen Saturation Interpretation: Normal      ---------------------------------------------------PHYSICAL EXAM--------------------------------------      Constitutional/General: Alert and oriented x3, well appearing, non toxic in NAD  Head: NC/AT  Eyes: PERRL, EOMI  Mouth: Oropharynx clear, handling secretions, no trismus, uvula is midline without any erythema or edema noted. Neck: Supple, full ROM, no meningeal signs  Pulmonary: Lungs clear to auscultation bilaterally, no wheezes, rales, or rhonchi. Not in respiratory distress  Cardiovascular:  Regular rate and rhythm, no murmurs, gallops, or rubs. 2+ distal pulses  Abdomen: Soft, non tender, non distended,   Extremities: Moves all extremities x 4. Warm and well perfused  Skin: warm and dry without rash, no urticarial noted to the left side of the face or anywhere else onto the body. He does have some mild swelling to the left side of his face. Mild erythema is noted. Neurologic: GCS 15,  Psych: Normal Affect      ------------------------------ ED COURSE/MEDICAL DECISION MAKING----------------------  Medications - No data to display      Medical Decision Making:    Patient vies to continue taking Benadryl every 6 hours around-the-clock use ice for the swelling will start on steroids advised to follow-up with PCP. Counseling: The emergency provider has spoken with the patient and discussed todays results, in addition to providing specific details for the plan of care and counseling regarding the diagnosis and prognosis.   Questions are answered at this time and they are agreeable with the plan.      --------------------------------- IMPRESSION AND DISPOSITION ---------------------------------    IMPRESSION  1.  Bee sting, undetermined intent, initial encounter        DISPOSITION  Disposition: Discharge to home  Patient condition is good                 ARLEN Richardson - CNP  08/11/22 0306

## 2022-08-11 NOTE — Clinical Note
Ismael Giles was seen and treated in our emergency department on 8/11/2022. He may return to work on 08/12/2022. If you have any questions or concerns, please don't hesitate to call.       Lizzie Godfrey, APRN - CNP

## 2022-09-01 ENCOUNTER — HOSPITAL ENCOUNTER (EMERGENCY)
Age: 37
Discharge: HOME | End: 2022-09-01
Payer: OTHER GOVERNMENT

## 2022-09-01 VITALS
WEIGHT: 207 LBS | TEMPERATURE: 98 F | SYSTOLIC BLOOD PRESSURE: 112 MMHG | RESPIRATION RATE: 18 BRPM | BODY MASS INDEX: 29.63 KG/M2 | HEART RATE: 83 BPM | HEIGHT: 70 IN | DIASTOLIC BLOOD PRESSURE: 84 MMHG | OXYGEN SATURATION: 98 %

## 2022-09-01 DIAGNOSIS — B34.9 VIRAL SYNDROME: Primary | ICD-10-CM

## 2022-09-01 LAB — SARS-COV-2, NAAT: NOT DETECTED

## 2022-09-01 PROCEDURE — 87635 SARS-COV-2 COVID-19 AMP PRB: CPT

## 2022-09-01 PROCEDURE — 99211 OFF/OP EST MAY X REQ PHY/QHP: CPT

## 2022-09-01 RX ORDER — PREDNISONE 10 MG/1
TABLET ORAL
Qty: 14 TABLET | Refills: 0 | Status: SHIPPED | OUTPATIENT
Start: 2022-09-01

## 2022-09-01 ASSESSMENT — PAIN - FUNCTIONAL ASSESSMENT: PAIN_FUNCTIONAL_ASSESSMENT: 0-10

## 2022-09-01 ASSESSMENT — PAIN DESCRIPTION - LOCATION: LOCATION: THROAT

## 2022-09-01 ASSESSMENT — PAIN SCALES - GENERAL: PAINLEVEL_OUTOF10: 4

## 2022-09-01 ASSESSMENT — PAIN DESCRIPTION - DESCRIPTORS: DESCRIPTORS: SORE

## 2022-09-01 NOTE — ED PROVIDER NOTES
[address] [facility]  Department of Emergency Medicine  UC Encounter Note  22   11:25 AM EDT      NAME: Jaxon Rose  :  1985  MRN:  [id number]    Chief Complaint: Fever (Woke up with fever this am. Exposed to rhinoentro virus ), Pharyngitis (Glands feel swollen/ hoarse voice), Nasal Congestion, Headache, and Back Pain (Right flank pain)      This is a 72-year-old male the presents to urgent care complaining of cough URI symptoms body aches and even some diarrhea for the past couple days. But no vomiting. No chest pain or shortness of breath. He states his kids were diagnosed with a viral illness several days ago as well. Patient on first contact is in no acute distress. Review of Systems  Pertinent positives and negatives are stated within HPI, all other systems reviewed and are negative. Physical Exam  Vitals and nursing note reviewed. Constitutional:       Appearance: He is well-developed. HENT:      Head: Normocephalic and atraumatic. Jaw: No trismus. Right Ear: Hearing, ear canal and external ear normal. Tympanic membrane is bulging. Left Ear: Hearing, ear canal and external ear normal. Tympanic membrane is bulging. Nose: Congestion and rhinorrhea present. Right Sinus: No maxillary sinus tenderness or frontal sinus tenderness. Left Sinus: No maxillary sinus tenderness or frontal sinus tenderness. Mouth/Throat:      Pharynx: Uvula midline. No uvula swelling. Eyes:      General: Lids are normal.      Conjunctiva/sclera: Conjunctivae normal.      Pupils: Pupils are equal, round, and reactive to light. Cardiovascular:      Rate and Rhythm: Normal rate and regular rhythm. Heart sounds: Normal heart sounds. No murmur heard. Pulmonary:      Effort: Pulmonary effort is normal.      Breath sounds: Normal breath sounds. Abdominal:      General: Bowel sounds are normal.      Palpations: Abdomen is soft. Abdomen is not rigid. Tenderness: There is no abdominal tenderness. There is no guarding or rebound. Musculoskeletal:      Cervical back: Normal range of motion and neck supple. Skin:     General: Skin is warm and dry. Findings: No abrasion or rash. Neurological:      General: No focal deficit present. Mental Status: He is alert and oriented to person, place, and time. GCS: GCS eye subscore is 4. GCS verbal subscore is 5. GCS motor subscore is 6. Cranial Nerves: No cranial nerve deficit. Sensory: No sensory deficit. Coordination: Coordination normal.      Gait: Gait normal.       Procedures    MDM           --------------------------------------------- PAST HISTORY ---------------------------------------------  Past Medical History:  has a past medical history of Allergic rhinitis, Anxiety, Chronic back pain, Cryptosporidial gastroenteritis ([facility] [address] Depression, Hyperlipidemia, and Neuropathy. Past Surgical History:  has a past surgical history that includes lumbar discectomy; Septoplasty; Cosmetic surgery; lumbar discectomy; vascular surgery; Upper gastrointestinal endoscopy (N/A, 3/18/2019); and Upper gastrointestinal endoscopy (3/18/2019). Social History:  reports that he has never smoked. He has never used smokeless tobacco. He reports current alcohol use. He reports current drug use. Frequency: 14.00 times per week. Drug: Marijuana Dianna Mata). Family History: family history is not on file. The patients home medications have been reviewed.     Allergies: Gabapentin, Percocet [oxycodone-acetaminophen], and Vicodin [hydrocodone-acetaminophen]    -------------------------------------------------- RESULTS -------------------------------------------------  Results for orders placed or performed during the hospital encounter of 09/01/22   COVID-19, Rapid    Specimen: Nasopharyngeal Swab   Result Value Ref Range    SARS-CoV-2, NAAT Not Detected Not Detected     No orders to display ------------------------- NURSING NOTES AND VITALS REVIEWED ---------------------------   The nursing notes within the ED encounter and vital signs as below have been reviewed. /84   Pulse 83   Temp 98 °F (36.7 °C)   Resp 18   Ht 5' 10\" (1.778 m)   Wt 207 lb (93.9 kg)   SpO2 98%   BMI 29.70 kg/m²   Oxygen Saturation Interpretation: Normal      ------------------------------------------ PROGRESS NOTES ------------------------------------------   I have spoken with the patient and discussed todays results, in addition to providing specific details for the plan of care and counseling regarding the diagnosis and prognosis. Their questions are answered at this time and they are agreeable with the plan.      --------------------------------- ADDITIONAL PROVIDER NOTES ---------------------------------     This patient is stable for discharge. I have shared the specific conditions for return, as well as the importance of follow-up. * NOTE: This report was transcribed using voice recognition software. Every effort was made to ensure accuracy; however, inadvertent computerized transcription errors may be present.    --------------------------------- IMPRESSION AND DISPOSITION ---------------------------------    IMPRESSION  1.  Viral syndrome        DISPOSITION  Disposition: Discharge to home  Patient condition is good       Janet Saunders PA-C  09/01/22 0357

## 2022-09-01 NOTE — Clinical Note
Desire Mcdaniels was seen and treated in our emergency department on 9/1/2022. He may return to work on 09/02/2022. If you have any questions or concerns, please don't hesitate to call.       Kristel Crabtree PA-C

## 2023-11-09 ENCOUNTER — TELEPHONE (OUTPATIENT)
Dept: FAMILY MEDICINE CLINIC | Age: 38
End: 2023-11-09

## 2023-11-09 NOTE — TELEPHONE ENCOUNTER
Patient called wanting appointment- has not been seen in office for over 3 years. I explained to him that he would need to be re-established as a new patient and appointments are not available until beginning to mid January. Patient mentioned he will call back.

## 2023-12-06 ENCOUNTER — OFFICE VISIT (OUTPATIENT)
Dept: FAMILY MEDICINE CLINIC | Age: 38
End: 2023-12-06
Payer: OTHER GOVERNMENT

## 2023-12-06 VITALS
DIASTOLIC BLOOD PRESSURE: 68 MMHG | SYSTOLIC BLOOD PRESSURE: 108 MMHG | TEMPERATURE: 98.1 F | BODY MASS INDEX: 34.65 KG/M2 | WEIGHT: 242 LBS | RESPIRATION RATE: 14 BRPM | HEIGHT: 70 IN | HEART RATE: 76 BPM | OXYGEN SATURATION: 98 %

## 2023-12-06 DIAGNOSIS — L60.3 DYSTROPHY OF NAIL DUE TO TRAUMA: ICD-10-CM

## 2023-12-06 DIAGNOSIS — F90.9 ATTENTION DEFICIT HYPERACTIVITY DISORDER (ADHD), UNSPECIFIED ADHD TYPE: Primary | ICD-10-CM

## 2023-12-06 DIAGNOSIS — J06.9 VIRAL URI: ICD-10-CM

## 2023-12-06 DIAGNOSIS — M54.50 CHRONIC LOW BACK PAIN, UNSPECIFIED BACK PAIN LATERALITY, UNSPECIFIED WHETHER SCIATICA PRESENT: ICD-10-CM

## 2023-12-06 DIAGNOSIS — G89.29 CHRONIC LOW BACK PAIN, UNSPECIFIED BACK PAIN LATERALITY, UNSPECIFIED WHETHER SCIATICA PRESENT: ICD-10-CM

## 2023-12-06 PROBLEM — R10.84 DIFFUSE ABDOMINAL PAIN: Status: RESOLVED | Noted: 2019-03-16 | Resolved: 2023-12-06

## 2023-12-06 PROCEDURE — G8484 FLU IMMUNIZE NO ADMIN: HCPCS | Performed by: FAMILY MEDICINE

## 2023-12-06 PROCEDURE — G8417 CALC BMI ABV UP PARAM F/U: HCPCS | Performed by: FAMILY MEDICINE

## 2023-12-06 PROCEDURE — G8427 DOCREV CUR MEDS BY ELIG CLIN: HCPCS | Performed by: FAMILY MEDICINE

## 2023-12-06 PROCEDURE — 1036F TOBACCO NON-USER: CPT | Performed by: FAMILY MEDICINE

## 2023-12-06 PROCEDURE — 99204 OFFICE O/P NEW MOD 45 MIN: CPT | Performed by: FAMILY MEDICINE

## 2023-12-06 RX ORDER — BUPROPION HYDROCHLORIDE 300 MG/1
300 TABLET ORAL EVERY MORNING
Qty: 30 TABLET | Refills: 1 | Status: SHIPPED | OUTPATIENT
Start: 2023-12-13

## 2023-12-06 RX ORDER — BUPROPION HYDROCHLORIDE 150 MG/1
150 TABLET ORAL EVERY MORNING
Qty: 7 TABLET | Refills: 0 | Status: SHIPPED | OUTPATIENT
Start: 2023-12-06 | End: 2023-12-13

## 2023-12-06 SDOH — ECONOMIC STABILITY: INCOME INSECURITY: HOW HARD IS IT FOR YOU TO PAY FOR THE VERY BASICS LIKE FOOD, HOUSING, MEDICAL CARE, AND HEATING?: NOT HARD AT ALL

## 2023-12-06 SDOH — ECONOMIC STABILITY: FOOD INSECURITY: WITHIN THE PAST 12 MONTHS, YOU WORRIED THAT YOUR FOOD WOULD RUN OUT BEFORE YOU GOT MONEY TO BUY MORE.: NEVER TRUE

## 2023-12-06 SDOH — ECONOMIC STABILITY: HOUSING INSECURITY
IN THE LAST 12 MONTHS, WAS THERE A TIME WHEN YOU DID NOT HAVE A STEADY PLACE TO SLEEP OR SLEPT IN A SHELTER (INCLUDING NOW)?: NO

## 2023-12-06 SDOH — ECONOMIC STABILITY: FOOD INSECURITY: WITHIN THE PAST 12 MONTHS, THE FOOD YOU BOUGHT JUST DIDN'T LAST AND YOU DIDN'T HAVE MONEY TO GET MORE.: NEVER TRUE

## 2023-12-06 ASSESSMENT — PATIENT HEALTH QUESTIONNAIRE - PHQ9
SUM OF ALL RESPONSES TO PHQ QUESTIONS 1-9: 0
1. LITTLE INTEREST OR PLEASURE IN DOING THINGS: 0
SUM OF ALL RESPONSES TO PHQ9 QUESTIONS 1 & 2: 0
2. FEELING DOWN, DEPRESSED OR HOPELESS: 0
SUM OF ALL RESPONSES TO PHQ QUESTIONS 1-9: 0

## 2023-12-06 ASSESSMENT — ENCOUNTER SYMPTOMS
ABDOMINAL PAIN: 0
BLOOD IN STOOL: 0
TROUBLE SWALLOWING: 0
BACK PAIN: 1
SORE THROAT: 1
WHEEZING: 0
COUGH: 0
SHORTNESS OF BREATH: 0

## 2024-01-12 ENCOUNTER — OFFICE VISIT (OUTPATIENT)
Dept: FAMILY MEDICINE CLINIC | Age: 39
End: 2024-01-12
Payer: MEDICAID

## 2024-01-12 VITALS
TEMPERATURE: 97.1 F | HEART RATE: 81 BPM | HEIGHT: 70 IN | OXYGEN SATURATION: 97 % | RESPIRATION RATE: 16 BRPM | WEIGHT: 243 LBS | SYSTOLIC BLOOD PRESSURE: 122 MMHG | BODY MASS INDEX: 34.79 KG/M2 | DIASTOLIC BLOOD PRESSURE: 76 MMHG

## 2024-01-12 DIAGNOSIS — G89.29 CHRONIC LEFT-SIDED LOW BACK PAIN WITH LEFT-SIDED SCIATICA: ICD-10-CM

## 2024-01-12 DIAGNOSIS — F90.9 ATTENTION DEFICIT HYPERACTIVITY DISORDER (ADHD), UNSPECIFIED ADHD TYPE: Primary | ICD-10-CM

## 2024-01-12 DIAGNOSIS — M54.42 CHRONIC LEFT-SIDED LOW BACK PAIN WITH LEFT-SIDED SCIATICA: ICD-10-CM

## 2024-01-12 PROCEDURE — G8484 FLU IMMUNIZE NO ADMIN: HCPCS | Performed by: FAMILY MEDICINE

## 2024-01-12 PROCEDURE — G8427 DOCREV CUR MEDS BY ELIG CLIN: HCPCS | Performed by: FAMILY MEDICINE

## 2024-01-12 PROCEDURE — 1036F TOBACCO NON-USER: CPT | Performed by: FAMILY MEDICINE

## 2024-01-12 PROCEDURE — G8417 CALC BMI ABV UP PARAM F/U: HCPCS | Performed by: FAMILY MEDICINE

## 2024-01-12 PROCEDURE — 99214 OFFICE O/P EST MOD 30 MIN: CPT | Performed by: FAMILY MEDICINE

## 2024-01-12 RX ORDER — METHYLPREDNISOLONE 4 MG/1
TABLET ORAL
Qty: 1 KIT | Refills: 0 | Status: SHIPPED | OUTPATIENT
Start: 2024-01-12

## 2024-01-12 RX ORDER — LISDEXAMFETAMINE DIMESYLATE CAPSULES 30 MG/1
30 CAPSULE ORAL DAILY
Qty: 30 CAPSULE | Refills: 0 | Status: SHIPPED | OUTPATIENT
Start: 2024-01-12 | End: 2024-02-11

## 2024-01-12 RX ORDER — CYCLOBENZAPRINE HCL 5 MG
5 TABLET ORAL 3 TIMES DAILY PRN
Qty: 15 TABLET | Refills: 0 | Status: SHIPPED | OUTPATIENT
Start: 2024-01-12 | End: 2024-01-22

## 2024-01-12 ASSESSMENT — PATIENT HEALTH QUESTIONNAIRE - PHQ9
1. LITTLE INTEREST OR PLEASURE IN DOING THINGS: 0
SUM OF ALL RESPONSES TO PHQ QUESTIONS 1-9: 0
SUM OF ALL RESPONSES TO PHQ QUESTIONS 1-9: 0
2. FEELING DOWN, DEPRESSED OR HOPELESS: 0
SUM OF ALL RESPONSES TO PHQ9 QUESTIONS 1 & 2: 0
SUM OF ALL RESPONSES TO PHQ QUESTIONS 1-9: 0
SUM OF ALL RESPONSES TO PHQ QUESTIONS 1-9: 0

## 2024-01-12 ASSESSMENT — ENCOUNTER SYMPTOMS
TROUBLE SWALLOWING: 0
SHORTNESS OF BREATH: 0
BACK PAIN: 1
WHEEZING: 0
COUGH: 0
BLOOD IN STOOL: 0
ABDOMINAL PAIN: 0

## 2024-01-12 NOTE — PROGRESS NOTES
fatigue, fever and unexpected weight change.   HENT:  Negative for trouble swallowing.    Eyes:  Negative for visual disturbance.   Respiratory:  Negative for cough, shortness of breath and wheezing.    Cardiovascular:  Negative for chest pain, palpitations and leg swelling.   Gastrointestinal:  Negative for abdominal pain and blood in stool.   Genitourinary:  Negative for dysuria and hematuria.   Musculoskeletal:  Positive for back pain.   Neurological:  Positive for weakness and numbness.        Lumbar radiculopathy   Psychiatric/Behavioral:  Positive for decreased concentration. Negative for dysphoric mood. The patient is nervous/anxious.        Health Maintenance Due   Topic Date Due    Varicella vaccine (1 of 2 - 2-dose childhood series) Never done    Hepatitis C screen  Never done    Polio vaccine (2 of 3 - Adult catch-up series) 01/08/2010    Diabetes screen  Never done    Flu vaccine (1) 08/01/2023    COVID-19 Vaccine (3 - 2023-24 season) 09/01/2023       Current Outpatient Medications   Medication Sig Dispense Refill    methylPREDNISolone (MEDROL DOSEPACK) 4 MG tablet Take by mouth. 1 kit 0    cyclobenzaprine (FLEXERIL) 5 MG tablet Take 1 tablet by mouth 3 times daily as needed for Muscle spasms 15 tablet 0    lisdexamfetamine (VYVANSE) 30 MG capsule Take 1 capsule by mouth daily for 30 days. Max Daily Amount: 30 mg 30 capsule 0    Diclofenac Sodium (VOLTAREN PO) Take 100 mg by mouth [facility]      diphenhydrAMINE (BENADRYL ALLERGY) 25 MG tablet Take 1 tablet by mouth every 6 hours as needed for Itching 30 tablet 0    montelukast (SINGULAIR) 10 MG tablet take 1 tablet by mouth once daily 30 tablet 3    Multiple Vitamins-Minerals (THERAPEUTIC MULTIVITAMIN-MINERALS) tablet Take 1 tablet by mouth daily       No current facility-administered medications for this visit.       History    Past Medical History:   Diagnosis Date    ADHD (attention deficit hyperactivity disorder)

## 2024-01-31 ENCOUNTER — OFFICE VISIT (OUTPATIENT)
Dept: PAIN MANAGEMENT | Age: 39
End: 2024-01-31
Payer: OTHER GOVERNMENT

## 2024-01-31 VITALS
SYSTOLIC BLOOD PRESSURE: 118 MMHG | OXYGEN SATURATION: 98 % | HEIGHT: 70 IN | RESPIRATION RATE: 18 BRPM | WEIGHT: 243 LBS | DIASTOLIC BLOOD PRESSURE: 84 MMHG | TEMPERATURE: 97.2 F | BODY MASS INDEX: 34.79 KG/M2 | HEART RATE: 95 BPM

## 2024-01-31 DIAGNOSIS — M96.1 POSTLAMINECTOMY SYNDROME, LUMBAR: Primary | ICD-10-CM

## 2024-01-31 DIAGNOSIS — M51.36 DDD (DEGENERATIVE DISC DISEASE), LUMBAR: ICD-10-CM

## 2024-01-31 DIAGNOSIS — M47.817 LUMBOSACRAL SPONDYLOSIS WITHOUT MYELOPATHY: ICD-10-CM

## 2024-01-31 DIAGNOSIS — M54.16 LUMBAR RADICULAR PAIN: ICD-10-CM

## 2024-01-31 PROCEDURE — G8484 FLU IMMUNIZE NO ADMIN: HCPCS | Performed by: ANESTHESIOLOGY

## 2024-01-31 PROCEDURE — 1036F TOBACCO NON-USER: CPT | Performed by: ANESTHESIOLOGY

## 2024-01-31 PROCEDURE — G8417 CALC BMI ABV UP PARAM F/U: HCPCS | Performed by: ANESTHESIOLOGY

## 2024-01-31 PROCEDURE — 99204 OFFICE O/P NEW MOD 45 MIN: CPT | Performed by: ANESTHESIOLOGY

## 2024-01-31 PROCEDURE — G8427 DOCREV CUR MEDS BY ELIG CLIN: HCPCS | Performed by: ANESTHESIOLOGY

## 2024-01-31 RX ORDER — NABUMETONE 750 MG/1
750 TABLET, FILM COATED ORAL 2 TIMES DAILY PRN
Qty: 30 TABLET | Refills: 1 | Status: SHIPPED | OUTPATIENT
Start: 2024-01-31

## 2024-01-31 NOTE — PROGRESS NOTES
Patient:  Fernando CostaPACHECO 1985  Date of Service:  24      Patient presents to [facility] with complaints of low back, radiates to BLE pain that started 14 years ago and has been getting worse.     He states the pain began following Trauma and fall off ladder in     Pain is constant and is described as aching, stabbing, dull, sharp, and penetrating. He rates the pain as a 7/10 on his worst day , 3/10 on his best day, and a 5/10 on average on the VAS scale.     Pain does radiate to both lower extremities. He  has numbness, tingling, weakness of the both lower extremities.    Alleviating factors include: heat, OTC NSAIDS, rest, and acetaminophen.  Aggravating factors include:  standing, sitting, bending, lifting. He states that the pain does keep him from sleeping at night. He took his last dose of Motrin, Tylenol, and Aleve  last night.     He is  on NSAIDS and  is not on anticoagulation medications to include none   Previous treatments: Physical Therapy, Aqua therapy, Caudal injections, TENS and medications..      Personal Expectations from this treatment: increase activity and decrease pain    Resp 18   Ht 1.778 m (5' 10\")   Wt 110.2 kg (243 lb)   BMI 34.87 kg/m²     No LMP for male patient.

## 2024-01-31 NOTE — PROGRESS NOTES
[facility]        [address], [place] [address]      [phone]                Consult Note      Patient:  Fernando CostaDOB 1985    Date of Service:  24     Requesting Physician:  Robin Joseph DO    Reason for Consult:      Patient presents with complaints of low back pain    HISTORY OF PRESENT ILLNESS:      Mr. Fernando Costa is a 38 y.o. male presented [facility] for evaluation of  chronic low back pain .    Prior L4-5 decompression and fusion - in [place].    Low back and b/l LE pain.    Pain is constant and is described as aching and throbbing.     Pain does radiate to both lower extremities.     He  has numbness, tingling, weakness of the both lower extremities     Patient does not have bladder or bowel dysfunction.    Alleviating factors include: rest.  Aggravating factors include: movement, bending, lifting.     Pain causes functional limitations/ limits Adl's : Yes    Nursing notes and details of the pain history reviewed. Please see intake notes for details.    [facility]    Allergic to gabapentin/ opioids.    Previous treatments:   Physical Therapy/ Aqua therapy : yes for > 6 weeks, HEP - yes     Chiropractic treatment: yes    Medications: - NSAID's : yes             - Membrane stabilizers : yes - gabapentin (did not tolerate)            - Opioids : no            - Adjuvants or Others : yes, Medical marijuana    Spine Surgeries: yes, L4-5 fusion.    Anticoagulation medications: no     H/O Smoking: no  H/O alcohol abuse : no  H/O Illicit drug use : no    Employment: employed- pharmacy tech.    Imaging:   Pertinent image findings reviewed.    Past Medical History:   Diagnosis Date    ADHD (attention deficit hyperactivity disorder)     Allergic rhinitis     Anxiety     Chronic back pain     Cryptosporidial gastroenteritis (HCC)     Depression     Hyperlipidemia     Neuropathy        Past

## 2024-02-09 ENCOUNTER — OFFICE VISIT (OUTPATIENT)
Dept: FAMILY MEDICINE CLINIC | Age: 39
End: 2024-02-09
Payer: OTHER GOVERNMENT

## 2024-02-09 VITALS
SYSTOLIC BLOOD PRESSURE: 110 MMHG | HEART RATE: 74 BPM | OXYGEN SATURATION: 98 % | HEIGHT: 70 IN | TEMPERATURE: 97.1 F | BODY MASS INDEX: 33.21 KG/M2 | DIASTOLIC BLOOD PRESSURE: 68 MMHG | WEIGHT: 232 LBS | RESPIRATION RATE: 16 BRPM

## 2024-02-09 DIAGNOSIS — F90.9 ATTENTION DEFICIT HYPERACTIVITY DISORDER (ADHD), UNSPECIFIED ADHD TYPE: Primary | ICD-10-CM

## 2024-02-09 DIAGNOSIS — J30.2 SEASONAL ALLERGIC RHINITIS, UNSPECIFIED TRIGGER: ICD-10-CM

## 2024-02-09 PROCEDURE — G8417 CALC BMI ABV UP PARAM F/U: HCPCS | Performed by: FAMILY MEDICINE

## 2024-02-09 PROCEDURE — G8427 DOCREV CUR MEDS BY ELIG CLIN: HCPCS | Performed by: FAMILY MEDICINE

## 2024-02-09 PROCEDURE — 1036F TOBACCO NON-USER: CPT | Performed by: FAMILY MEDICINE

## 2024-02-09 PROCEDURE — 99213 OFFICE O/P EST LOW 20 MIN: CPT | Performed by: FAMILY MEDICINE

## 2024-02-09 PROCEDURE — G8484 FLU IMMUNIZE NO ADMIN: HCPCS | Performed by: FAMILY MEDICINE

## 2024-02-09 RX ORDER — LISDEXAMFETAMINE DIMESYLATE CAPSULES 50 MG/1
50 CAPSULE ORAL DAILY
Qty: 30 CAPSULE | Refills: 0 | Status: SHIPPED | OUTPATIENT
Start: 2024-02-09 | End: 2024-03-10

## 2024-02-09 RX ORDER — MONTELUKAST SODIUM 10 MG/1
10 TABLET ORAL DAILY
Qty: 30 TABLET | Refills: 0 | Status: SHIPPED | OUTPATIENT
Start: 2024-02-09

## 2024-02-09 ASSESSMENT — ENCOUNTER SYMPTOMS
WHEEZING: 0
TROUBLE SWALLOWING: 0
SHORTNESS OF BREATH: 0
BACK PAIN: 1
RHINORRHEA: 1
COUGH: 0

## 2024-02-09 ASSESSMENT — PATIENT HEALTH QUESTIONNAIRE - PHQ9
SUM OF ALL RESPONSES TO PHQ QUESTIONS 1-9: 0
SUM OF ALL RESPONSES TO PHQ QUESTIONS 1-9: 0
1. LITTLE INTEREST OR PLEASURE IN DOING THINGS: 0
SUM OF ALL RESPONSES TO PHQ QUESTIONS 1-9: 0
2. FEELING DOWN, DEPRESSED OR HOPELESS: 0
SUM OF ALL RESPONSES TO PHQ9 QUESTIONS 1 & 2: 0
SUM OF ALL RESPONSES TO PHQ QUESTIONS 1-9: 0

## 2024-02-09 NOTE — PROGRESS NOTES
Multiple Vitamins-Minerals (THERAPEUTIC MULTIVITAMIN-MINERALS) tablet Take 1 tablet by mouth daily       No current facility-administered medications for this visit.       History    Past Medical History:   Diagnosis Date    ADHD (attention deficit hyperactivity disorder)     Allergic rhinitis     Anxiety     Chronic back pain     Cryptosporidial gastroenteritis (HCC)     Depression     Hyperlipidemia     Neuropathy        Past Surgical History:   Procedure Laterality Date    COSMETIC SURGERY      LUMBAR DISCECTOMY      LUMBAR DISCECTOMY      SEPTOPLASTY      UPPER GASTROINTESTINAL ENDOSCOPY N/A 3/18/2019    EGD ESOPHAGOGASTRODUODENOSCOPY with biopsy performed by Adolfo Reynolds DO at [facility] ENDOSCOPY    UPPER GASTROINTESTINAL ENDOSCOPY  3/18/2019    EGD BIOPSY performed by Adolfo Reynolds DO at [facility] ENDOSCOPY    VASCULAR SURGERY      varicocelectomy       Allergies   Allergen Reactions    Gabapentin Hives    Percocet [Oxycodone-Acetaminophen] Nausea And Vomiting    Vicodin [Hydrocodone-Acetaminophen] Nausea And Vomiting       Family History   Problem Relation Age of Onset    Hypertension Mother     Rheum Arthritis Mother     Elevated Lipids Father     Bipolar Disorder Brother     No Known Problems Brother        Social History     Socioeconomic History    Marital status:      Spouse name: None    Number of children: None    Years of education: None    Highest education level: None   Tobacco Use    Smoking status: Never    Smokeless tobacco: Never   Vaping Use    Vaping Use: Never used   Substance and Sexual Activity    Alcohol use: Yes     Comment: occasionally    Drug use: Not Currently     Types: Marijuana (Weed)    Sexual activity: Yes     Partners: Female     Social Determinants of Health     Financial Resource Strain: Low Risk  (12/6/2023)    Overall Financial Resource Strain (CARDIA)     Difficulty of Paying Living Expenses: Not hard at all   Food Insecurity: No Food Insecurity (12/6/2023)    Hunger

## 2024-02-23 ENCOUNTER — TELEPHONE (OUTPATIENT)
Dept: FAMILY MEDICINE CLINIC | Age: 39
End: 2024-02-23

## 2024-02-23 NOTE — TELEPHONE ENCOUNTER
Vyvanse denied; must try Amphetamine ER, Focalin XR, Dextroamphetamine ER, Methylphenidatte ER, Concerta, Ritalin SR [place] ER; denial in Media for further info

## 2024-02-28 ENCOUNTER — TELEMEDICINE (OUTPATIENT)
Dept: PAIN MANAGEMENT | Age: 39
End: 2024-02-28
Payer: MEDICAID

## 2024-02-28 DIAGNOSIS — M96.1 POSTLAMINECTOMY SYNDROME, LUMBAR: Primary | ICD-10-CM

## 2024-02-28 DIAGNOSIS — M47.817 LUMBOSACRAL SPONDYLOSIS WITHOUT MYELOPATHY: ICD-10-CM

## 2024-02-28 DIAGNOSIS — M54.16 LUMBAR RADICULAR PAIN: ICD-10-CM

## 2024-02-28 DIAGNOSIS — M51.36 DDD (DEGENERATIVE DISC DISEASE), LUMBAR: ICD-10-CM

## 2024-02-28 PROCEDURE — 1036F TOBACCO NON-USER: CPT | Performed by: ANESTHESIOLOGY

## 2024-02-28 PROCEDURE — G8484 FLU IMMUNIZE NO ADMIN: HCPCS | Performed by: ANESTHESIOLOGY

## 2024-02-28 PROCEDURE — G8428 CUR MEDS NOT DOCUMENT: HCPCS | Performed by: ANESTHESIOLOGY

## 2024-02-28 PROCEDURE — 99213 OFFICE O/P EST LOW 20 MIN: CPT | Performed by: ANESTHESIOLOGY

## 2024-02-28 PROCEDURE — G8417 CALC BMI ABV UP PARAM F/U: HCPCS | Performed by: ANESTHESIOLOGY

## 2024-02-28 NOTE — PROGRESS NOTES
discussed with the patient including medication and procedure side effects.     Controlled Substances Monitoring: [place]AR[place] reviewed.     MD Fernando Tellez, was evaluated through a synchronous (real-time) audio-video encounter. The patient (or guardian if applicable) is aware that this is a billable service, which includes applicable co-pays. This Virtual Visit was conducted with patient's (and/or legal guardian's) consent. Patient identification was verified, and a caregiver was present when appropriate.   The patient was located at Home: [facility] [address] [address]  Provider was located at Facility (Appt Dept): [address] [place] [address]        Total time spent on this encounter: Not billed by time    --Papi Lauren MD on 2/28/2024 at 3:37 PM    An electronic signature was used to authenticate this note.

## 2024-03-07 ENCOUNTER — OFFICE VISIT (OUTPATIENT)
Dept: FAMILY MEDICINE CLINIC | Age: 39
End: 2024-03-07
Payer: MEDICAID

## 2024-03-07 ENCOUNTER — TELEPHONE (OUTPATIENT)
Dept: FAMILY MEDICINE CLINIC | Age: 39
End: 2024-03-07

## 2024-03-07 VITALS
TEMPERATURE: 97.3 F | BODY MASS INDEX: 32.64 KG/M2 | HEART RATE: 83 BPM | WEIGHT: 228 LBS | RESPIRATION RATE: 17 BRPM | DIASTOLIC BLOOD PRESSURE: 88 MMHG | SYSTOLIC BLOOD PRESSURE: 116 MMHG | HEIGHT: 70 IN | OXYGEN SATURATION: 95 %

## 2024-03-07 DIAGNOSIS — J30.2 SEASONAL ALLERGIC RHINITIS, UNSPECIFIED TRIGGER: ICD-10-CM

## 2024-03-07 DIAGNOSIS — F90.9 ATTENTION DEFICIT HYPERACTIVITY DISORDER (ADHD), UNSPECIFIED ADHD TYPE: ICD-10-CM

## 2024-03-07 PROCEDURE — G8417 CALC BMI ABV UP PARAM F/U: HCPCS | Performed by: FAMILY MEDICINE

## 2024-03-07 PROCEDURE — G8484 FLU IMMUNIZE NO ADMIN: HCPCS | Performed by: FAMILY MEDICINE

## 2024-03-07 PROCEDURE — 1036F TOBACCO NON-USER: CPT | Performed by: FAMILY MEDICINE

## 2024-03-07 PROCEDURE — G8427 DOCREV CUR MEDS BY ELIG CLIN: HCPCS | Performed by: FAMILY MEDICINE

## 2024-03-07 PROCEDURE — 99213 OFFICE O/P EST LOW 20 MIN: CPT | Performed by: FAMILY MEDICINE

## 2024-03-07 RX ORDER — LISDEXAMFETAMINE DIMESYLATE CAPSULES 50 MG/1
50 CAPSULE ORAL DAILY
Qty: 30 CAPSULE | Refills: 0 | Status: SHIPPED
Start: 2024-04-06 | End: 2024-03-08 | Stop reason: SDUPTHER

## 2024-03-07 RX ORDER — MONTELUKAST SODIUM 10 MG/1
10 TABLET ORAL DAILY
Qty: 90 TABLET | Refills: 0 | Status: SHIPPED | OUTPATIENT
Start: 2024-03-07

## 2024-03-07 RX ORDER — TRIAMCINOLONE ACETONIDE 1 MG/G
OINTMENT TOPICAL
COMMUNITY
Start: 2024-02-28

## 2024-03-07 RX ORDER — LISDEXAMFETAMINE DIMESYLATE CAPSULES 50 MG/1
50 CAPSULE ORAL DAILY
Qty: 30 CAPSULE | Refills: 0 | Status: SHIPPED
Start: 2024-03-07 | End: 2024-03-08 | Stop reason: SDUPTHER

## 2024-03-07 RX ORDER — FLUTICASONE PROPIONATE 50 MCG
1 SPRAY, SUSPENSION (ML) NASAL DAILY
COMMUNITY

## 2024-03-07 RX ORDER — DOXYCYCLINE HYCLATE 100 MG
TABLET ORAL
COMMUNITY
Start: 2024-02-28

## 2024-03-07 RX ORDER — LISDEXAMFETAMINE DIMESYLATE CAPSULES 50 MG/1
50 CAPSULE ORAL DAILY
Qty: 30 CAPSULE | Refills: 0 | Status: SHIPPED | OUTPATIENT
Start: 2024-05-06 | End: 2024-06-05

## 2024-03-07 ASSESSMENT — ENCOUNTER SYMPTOMS
SHORTNESS OF BREATH: 0
COUGH: 0
BACK PAIN: 1
WHEEZING: 0

## 2024-03-07 NOTE — PROGRESS NOTES
Fernando Costa   Patient is a 39 y.o. year old male who presents with:  Chief Complaint   Patient presents with    Allergies     Pt states doing better since doing flonase    ADHD     Pt states doing better with focus     [facility]    HPI    [facility]24 - LDL 130s, FBG 98, no TFTs. Plan to order next visit to be done prior to subsequent.     Low back pain W LLE radiation  Following w PM    Anxiety and depression, ADHD  Current tx: vyvanse 50mg daily  Dose was increased lat visit.   Doing well overall, at times some loss of focus but much less than in the past and adequately controlled. Some dry mouth but otherwise     Allergic rhinitis  Current tx flonase, xyzol, singulair  Saw allergist in the remote past and multiple allergies identified per patient    Review of Systems   Constitutional:  Negative for fatigue and unexpected weight change.   Respiratory:  Negative for cough, shortness of breath and wheezing.    Cardiovascular:  Negative for chest pain, palpitations and leg swelling.   Genitourinary:  Negative for dysuria and hematuria.   Musculoskeletal:  Positive for back pain.   Neurological:  Positive for weakness, numbness and headaches (intermittent, unchanged).        Lumbar radiculopathy   Psychiatric/Behavioral:  Negative for decreased concentration, dysphoric mood and sleep disturbance. The patient is not nervous/anxious.        Health Maintenance Due   Topic Date Due    Varicella vaccine (1 of 2 - 2-dose childhood series) Never done    Hepatitis C screen  Never done    Polio vaccine (2 of 3 - Adult catch-up series) 01/08/2010    Diabetes screen  Never done    Flu vaccine (1) 08/01/2023    COVID-19 Vaccine (3 - 2023-24 season) 09/01/2023     Flu: declines    Current Outpatient Medications   Medication Sig Dispense Refill    doxycycline hyclate (VIBRA-TABS) 100 MG tablet TAKE ONE TABLET BY MOUTH TWO TIMES A DAY FOR 1 MONTH.      triamcinolone (KENALOG) 0.1 %

## 2024-03-07 NOTE — TELEPHONE ENCOUNTER
[facility] called regarding pt's rx for Vyvanse. They need to know if they should fill it RADHA for brand name or generic.

## 2024-05-31 ENCOUNTER — OFFICE VISIT (OUTPATIENT)
Dept: FAMILY MEDICINE CLINIC | Age: 39
End: 2024-05-31

## 2024-05-31 VITALS
DIASTOLIC BLOOD PRESSURE: 70 MMHG | OXYGEN SATURATION: 98 % | WEIGHT: 233 LBS | BODY MASS INDEX: 33.36 KG/M2 | TEMPERATURE: 97.3 F | SYSTOLIC BLOOD PRESSURE: 118 MMHG | HEART RATE: 76 BPM | HEIGHT: 70 IN | RESPIRATION RATE: 16 BRPM

## 2024-05-31 DIAGNOSIS — Z02.89 ENCOUNTER FOR PHYSICAL EXAMINATION OF PROSPECTIVE FOSTER PARENT: ICD-10-CM

## 2024-05-31 DIAGNOSIS — F90.9 ATTENTION DEFICIT HYPERACTIVITY DISORDER (ADHD), UNSPECIFIED ADHD TYPE: Primary | ICD-10-CM

## 2024-05-31 RX ORDER — LISDEXAMFETAMINE DIMESYLATE 60 MG/1
60 CAPSULE ORAL DAILY
Qty: 30 CAPSULE | Refills: 0 | Status: SHIPPED | OUTPATIENT
Start: 2024-07-30 | End: 2024-08-29

## 2024-05-31 RX ORDER — LISDEXAMFETAMINE DIMESYLATE 60 MG/1
60 CAPSULE ORAL DAILY
Qty: 30 CAPSULE | Refills: 0 | Status: SHIPPED | OUTPATIENT
Start: 2024-05-31 | End: 2024-06-30

## 2024-05-31 RX ORDER — LISDEXAMFETAMINE DIMESYLATE 60 MG/1
60 CAPSULE ORAL DAILY
Qty: 30 CAPSULE | Refills: 0 | Status: SHIPPED | OUTPATIENT
Start: 2024-06-30 | End: 2024-07-30

## 2024-05-31 ASSESSMENT — ENCOUNTER SYMPTOMS
WHEEZING: 0
SHORTNESS OF BREATH: 0
BACK PAIN: 1
COUGH: 0

## 2024-05-31 NOTE — PROGRESS NOTES
Fernando Costa   Patient is a 39 y.o. year old male who presents with:  Chief Complaint   Patient presents with    ADHD     Wants to discuss Vyvanse    Paperwork     Needs paper work for adoption completed today     [facility] -low [facility]    HPI    [facility] - LDL 130s, FBG 98, no TFTs.     Going through adoption process and provides forms to be completed. Will be adopting child who they have already been granted emergency custody of male son who he has had since he was 6 days old, he is currently almost 1.5 years. Born prematurely to mother w substance use problems and was in the NICU. Has some developmental problems but is doing well.     Anxiety and depression, ADHD  Current tx: vyvanse 50mg daily  Recent changes in treatment: none  Reports symptoms; focus, task completion w/o distraction remains controlled for around 6 hours but wanes shortly afterward. Takes medication around 730 AM, work starts 9 and is 8-10 hours.   The only perceived adverse effect is dry mouth. Denies irritability, sleep disturbance, palpitations, CP.   Past tx: wellbutrin - ineffective     Allergic rhinitis  Current tx flonase, xyzol, singulair  Saw allergist in the remote past and multiple allergies identified per patient    Review of Systems   Constitutional:  Negative for fatigue and unexpected weight change.   Respiratory:  Negative for cough, shortness of breath and wheezing.    Cardiovascular:  Negative for chest pain, palpitations and leg swelling.   Genitourinary:  Negative for dysuria and hematuria.   Musculoskeletal:  Positive for back pain.   Neurological:  Positive for weakness and numbness.        Lumbar radiculopathy   Psychiatric/Behavioral:  Positive for decreased concentration. Negative for agitation, dysphoric mood and sleep disturbance. The patient is not nervous/anxious.        Health Maintenance Due   Topic Date Due    Varicella vaccine (1 of 2 - 2-dose childhood series) Never

## 2024-06-20 ENCOUNTER — HOSPITAL ENCOUNTER (EMERGENCY)
Age: 39
Discharge: HOME | End: 2024-06-20
Payer: OTHER GOVERNMENT

## 2024-06-20 VITALS
WEIGHT: 224 LBS | TEMPERATURE: 98.6 F | DIASTOLIC BLOOD PRESSURE: 83 MMHG | HEART RATE: 85 BPM | RESPIRATION RATE: 18 BRPM | SYSTOLIC BLOOD PRESSURE: 132 MMHG | BODY MASS INDEX: 32.14 KG/M2 | OXYGEN SATURATION: 93 %

## 2024-06-20 DIAGNOSIS — B35.6 TINEA CRURIS: Primary | ICD-10-CM

## 2024-06-20 PROCEDURE — 6370000000 HC RX 637 (ALT 250 FOR IP): Performed by: NURSE PRACTITIONER

## 2024-06-20 PROCEDURE — 99211 OFF/OP EST MAY X REQ PHY/QHP: CPT

## 2024-06-20 RX ORDER — FLUCONAZOLE 150 MG/1
150 TABLET ORAL ONCE
Status: COMPLETED | OUTPATIENT
Start: 2024-06-20 | End: 2024-06-20

## 2024-06-20 RX ORDER — HYDROCORTISONE 2.5% / KETOCONAZOLE 2% 2.5; 2 G/100G; G/100G
1 CREAM TOPICAL 2 TIMES DAILY
Qty: 30 G | Refills: 0 | Status: SHIPPED | OUTPATIENT
Start: 2024-06-20 | End: 2024-06-27

## 2024-06-20 RX ADMIN — FLUCONAZOLE 150 MG: 150 TABLET ORAL at 18:08

## 2024-06-20 ASSESSMENT — PAIN - FUNCTIONAL ASSESSMENT: PAIN_FUNCTIONAL_ASSESSMENT: 0-10

## 2024-06-20 ASSESSMENT — PAIN SCALES - GENERAL: PAINLEVEL_OUTOF10: 0

## 2024-06-20 NOTE — ED PROVIDER NOTES
plan.  Assessment      1. Tinea cruris      Plan   Discharged home.  Patient condition is good    New Medications     Discharge Medication List as of 6/20/2024  6:10 PM        START taking these medications    Details   Ketoconazole-Hydrocortisone 2-2.5 % CREA Apply 1 Application topically 2 times daily for 7 days, Disp-30 g, R-0Normal           Electronically signed by ARLEN Randall CNP   DD: 6/20/24  **This report was transcribed using voice recognition software. Every effort was made to ensure accuracy; however, inadvertent computerized transcription errors may be present.  END OF ED PROVIDER NOTE       Dwaine Frausto APRN - CNP  06/20/24 [id number]

## 2024-08-15 ENCOUNTER — TELEMEDICINE (OUTPATIENT)
Dept: FAMILY MEDICINE CLINIC | Age: 39
End: 2024-08-15
Payer: MEDICAID

## 2024-08-15 DIAGNOSIS — B00.1 COLD SORE: Primary | ICD-10-CM

## 2024-08-15 PROCEDURE — 1036F TOBACCO NON-USER: CPT | Performed by: FAMILY MEDICINE

## 2024-08-15 PROCEDURE — G8427 DOCREV CUR MEDS BY ELIG CLIN: HCPCS | Performed by: FAMILY MEDICINE

## 2024-08-15 PROCEDURE — G8417 CALC BMI ABV UP PARAM F/U: HCPCS | Performed by: FAMILY MEDICINE

## 2024-08-15 PROCEDURE — 99213 OFFICE O/P EST LOW 20 MIN: CPT | Performed by: FAMILY MEDICINE

## 2024-08-15 RX ORDER — VALACYCLOVIR HYDROCHLORIDE 1 G/1
TABLET, FILM COATED ORAL
Qty: 12 TABLET | Refills: 0 | Status: SHIPPED | OUTPATIENT
Start: 2024-08-15

## 2024-08-15 ASSESSMENT — ENCOUNTER SYMPTOMS: SHORTNESS OF BREATH: 0

## 2024-08-15 NOTE — PROGRESS NOTES
Dispense: 12 tablet; Refill: 0    Return for routine f/u as previously scheduled, or sooner if needed.     Robin Joseph, DO  08/15/24  5:46 PM    There are no Patient Instructions on file for this visit.

## 2024-09-06 ENCOUNTER — OFFICE VISIT (OUTPATIENT)
Dept: FAMILY MEDICINE CLINIC | Age: 39
End: 2024-09-06

## 2024-09-06 VITALS
OXYGEN SATURATION: 98 % | TEMPERATURE: 97.2 F | HEART RATE: 78 BPM | DIASTOLIC BLOOD PRESSURE: 70 MMHG | SYSTOLIC BLOOD PRESSURE: 118 MMHG | BODY MASS INDEX: 32.93 KG/M2 | RESPIRATION RATE: 16 BRPM | HEIGHT: 70 IN | WEIGHT: 230 LBS

## 2024-09-06 DIAGNOSIS — R31.9 HEMATURIA, UNSPECIFIED TYPE: ICD-10-CM

## 2024-09-06 DIAGNOSIS — F90.9 ATTENTION DEFICIT HYPERACTIVITY DISORDER (ADHD), UNSPECIFIED ADHD TYPE: ICD-10-CM

## 2024-09-06 DIAGNOSIS — E78.5 DYSLIPIDEMIA: ICD-10-CM

## 2024-09-06 DIAGNOSIS — E55.9 VITAMIN D DEFICIENCY: Primary | ICD-10-CM

## 2024-09-06 DIAGNOSIS — B00.1 COLD SORE: ICD-10-CM

## 2024-09-06 DIAGNOSIS — S54.22XA INJURY OF RADIAL NERVE AT LEFT FOREARM LEVEL, INITIAL ENCOUNTER: ICD-10-CM

## 2024-09-06 RX ORDER — LISDEXAMFETAMINE DIMESYLATE 70 MG/1
70 CAPSULE ORAL EVERY MORNING
COMMUNITY

## 2024-09-06 ASSESSMENT — ENCOUNTER SYMPTOMS
COUGH: 0
SHORTNESS OF BREATH: 0
WHEEZING: 0
BACK PAIN: 1

## 2024-09-06 ASSESSMENT — PATIENT HEALTH QUESTIONNAIRE - PHQ9
SUM OF ALL RESPONSES TO PHQ QUESTIONS 1-9: 0
1. LITTLE INTEREST OR PLEASURE IN DOING THINGS: NOT AT ALL
2. FEELING DOWN, DEPRESSED OR HOPELESS: NOT AT ALL
SUM OF ALL RESPONSES TO PHQ9 QUESTIONS 1 & 2: 0

## 2024-09-06 NOTE — PROGRESS NOTES
Fernando Costa   Patient is a 39 y.o. year old male who presents with:  Chief Complaint   Patient presents with    3 Month Follow-Up     3 month follow up     ADHD     Getting counseling for ADHD and they will take over scripts     Pain     Severe left lower arm, hand pain since getting blood drawn Monday- possible nerve issues? Wearing  a brace on left wrist due to pain - was even unable to  a cup of water does get worse at times       [facility] - [facility]    HPI    [facility] 3/[facility]24 - LDL 130s, FBG 98, no TFTs.    [facility] LDL 150s, UA w RBC, repeated yesterday w UC pending. [facility] managing.     Left forearm/hand pain  After phlebotomy three days ago immediately felt electric sensation and numbness throughout the forearm, lasted 30 seconds. Since then continues to have a constant pain at the radial aspect of the wrist with occasional numbness/tingling, and electric sensation into the radial aspect. Significantly improved since onset.     Cold sores  Current treatment valacyclovir as needed    Anxiety and depression, ADHD - psych  Current tx: vyvanse 70mg daily  Recent changes in treatment: Increased dose last visit to 60 mg, more recently was increased to 70 mg by psychiatry  Past tx: wellbutrin - ineffective     Allergic rhinitis  Current tx flonase, xyzol, singulair - seasonally as needed  Saw allergist in the remote past and multiple allergies identified per patient    Vitamin D deficiency  Current treatment: none  Recent changes in medication: none  Vit D 24 9/2024    Review of Systems   Constitutional:  Negative for fatigue and unexpected weight change.   Respiratory:  Negative for cough, shortness of breath and wheezing.    Cardiovascular:  Negative for chest pain, palpitations and leg swelling.   Genitourinary:  Negative for dysuria and hematuria.   Musculoskeletal:  Positive for back pain.   Neurological:  Positive for weakness and

## 2024-12-27 ENCOUNTER — TELEPHONE (OUTPATIENT)
Dept: FAMILY MEDICINE CLINIC | Age: 39
End: 2024-12-27

## 2024-12-27 NOTE — TELEPHONE ENCOUNTER
Pt would like to test for cryptosporidium and would like a fecal test ordered. Pt has had gastro pain and diarrhea since Monday. Has not eaten solid food (unable to finish toast), only broths, water, and black coffee.       Last seen 9/6/2024  Next appt Visit date not found      .

## (undated) DEVICE — SPONGE GZ 4IN 4IN 4 PLY N WVN AVANT

## (undated) DEVICE — LUBRICANT SURG JELLY ST BACTER TUBE 4.25OZ

## (undated) DEVICE — 6 X 9  1.75MIL 4-WALL LABGUARD: Brand: MINIGRIP COMMERCIAL LLC

## (undated) DEVICE — MASK,FACE,MAXFLUIDPROTECT,SHIELD/ERLPS: Brand: MEDLINE

## (undated) DEVICE — CONTAINER SPEC COLL 960ML POLYPR TRIANG GRAD INTAKE/OUTPUT

## (undated) DEVICE — FORCEPS BX L240CM JAW DIA2.8MM L CAP W/ NDL MIC MESH TOOTH

## (undated) DEVICE — TUBING, SUCTION, 1/4" X 10', STRAIGHT: Brand: MEDLINE

## (undated) DEVICE — KIT BEDSIDE REVITAL OX 500ML

## (undated) DEVICE — GOWN ISOLATN REG YEL M WT MULTIPLY SIDETIE LEV 2

## (undated) DEVICE — Device: Brand: DEFENDO VALVE AND CONNECTOR KIT

## (undated) DEVICE — BLOCK BITE 60FR CAREGUARD

## (undated) DEVICE — KENDALL 450 SERIES MONITORING FOAM ELECTRODE - RECTANGULAR SHAPE ( 3/PK): Brand: KENDALL

## (undated) DEVICE — YANKAUER,BULB TIP,W/O VENT,RIGID,STERILE: Brand: MEDLINE

## (undated) DEVICE — TOWEL,OR,DSP,ST,BLUE,STD,6/PK,12PK/CS: Brand: MEDLINE